# Patient Record
Sex: FEMALE | Race: WHITE
[De-identification: names, ages, dates, MRNs, and addresses within clinical notes are randomized per-mention and may not be internally consistent; named-entity substitution may affect disease eponyms.]

---

## 2018-03-22 ENCOUNTER — HOSPITAL ENCOUNTER (INPATIENT)
Dept: HOSPITAL 88 - ER | Age: 83
LOS: 12 days | Discharge: HOME HEALTH | DRG: 330 | End: 2018-04-03
Attending: INTERNAL MEDICINE | Admitting: INTERNAL MEDICINE
Payer: COMMERCIAL

## 2018-03-22 VITALS — HEIGHT: 60 IN | WEIGHT: 99.06 LBS | BODY MASS INDEX: 19.45 KG/M2

## 2018-03-22 DIAGNOSIS — K44.9: ICD-10-CM

## 2018-03-22 DIAGNOSIS — Z95.5: ICD-10-CM

## 2018-03-22 DIAGNOSIS — D50.0: ICD-10-CM

## 2018-03-22 DIAGNOSIS — R13.10: ICD-10-CM

## 2018-03-22 DIAGNOSIS — C78.7: ICD-10-CM

## 2018-03-22 DIAGNOSIS — C79.70: ICD-10-CM

## 2018-03-22 DIAGNOSIS — I10: ICD-10-CM

## 2018-03-22 DIAGNOSIS — K64.8: ICD-10-CM

## 2018-03-22 DIAGNOSIS — K56.7: ICD-10-CM

## 2018-03-22 DIAGNOSIS — D62: ICD-10-CM

## 2018-03-22 DIAGNOSIS — K59.00: ICD-10-CM

## 2018-03-22 DIAGNOSIS — K22.2: ICD-10-CM

## 2018-03-22 DIAGNOSIS — R00.1: ICD-10-CM

## 2018-03-22 DIAGNOSIS — K29.70: ICD-10-CM

## 2018-03-22 DIAGNOSIS — R19.7: ICD-10-CM

## 2018-03-22 DIAGNOSIS — I25.10: ICD-10-CM

## 2018-03-22 DIAGNOSIS — C18.2: Primary | ICD-10-CM

## 2018-03-22 LAB
ALBUMIN SERPL-MCNC: 3.4 G/DL (ref 3.5–5)
ALBUMIN/GLOB SERPL: 1.1 {RATIO} (ref 0.8–2)
ALP SERPL-CCNC: 64 IU/L (ref 40–150)
ALT SERPL-CCNC: 7 IU/L (ref 0–55)
ANION GAP SERPL CALC-SCNC: 12.1 MMOL/L (ref 8–16)
BACTERIA URNS QL MICRO: (no result) /HPF
BASOPHILS # BLD AUTO: 0 10*3/UL (ref 0–0.1)
BASOPHILS NFR BLD AUTO: 0.5 % (ref 0–1)
BILIRUB UR QL: NEGATIVE
BUN SERPL-MCNC: 18 MG/DL (ref 7–26)
BUN/CREAT SERPL: 27 (ref 6–25)
CALCIUM SERPL-MCNC: 9.1 MG/DL (ref 8.4–10.2)
CHLORIDE SERPL-SCNC: 107 MMOL/L (ref 98–107)
CK MB SERPL-MCNC: 0.7 NG/ML (ref 0–5)
CK SERPL-CCNC: 60 IU/L (ref 29–168)
CLARITY UR: (no result)
CO2 SERPL-SCNC: 26 MMOL/L (ref 22–29)
COLOR UR: YELLOW
DEPRECATED APTT PLAS QN: 27.2 SECONDS (ref 23.8–35.5)
DEPRECATED INR PLAS: 1.11
DEPRECATED NEUTROPHILS # BLD AUTO: 3.6 10*3/UL (ref 2.1–6.9)
DEPRECATED RBC URNS MANUAL-ACNC: (no result) /HPF (ref 0–5)
EGFRCR SERPLBLD CKD-EPI 2021: > 60 ML/MIN (ref 60–?)
EOSINOPHIL # BLD AUTO: 0.1 10*3/UL (ref 0–0.4)
EOSINOPHIL NFR BLD AUTO: 1.4 % (ref 0–6)
EPI CELLS URNS QL MICRO: (no result) /LPF
ERYTHROCYTE [DISTWIDTH] IN CORD BLOOD: 20.6 % (ref 11.7–14.4)
GLOBULIN PLAS-MCNC: 3.1 G/DL (ref 2.3–3.5)
GLUCOSE SERPLBLD-MCNC: 118 MG/DL (ref 74–118)
HCT VFR BLD AUTO: 20.5 % (ref 34.2–44.1)
HGB BLD-MCNC: 5.6 G/DL (ref 12–16)
KETONES UR QL STRIP.AUTO: NEGATIVE
LEUKOCYTE ESTERASE UR QL STRIP.AUTO: (no result)
LYMPHOCYTES # BLD: 1.8 10*3/UL (ref 1–3.2)
LYMPHOCYTES NFR BLD AUTO: 29.5 % (ref 18–39.1)
MCH RBC QN AUTO: 17.2 PG (ref 28–32)
MCHC RBC AUTO-ENTMCNC: 27.3 G/DL (ref 31–35)
MCV RBC AUTO: 62.9 FL (ref 81–99)
MONOCYTES # BLD AUTO: 0.6 10*3/UL (ref 0.2–0.8)
MONOCYTES NFR BLD AUTO: 9.8 % (ref 4.4–11.3)
NEUTS SEG NFR BLD AUTO: 58.5 % (ref 38.7–80)
NITRITE UR QL STRIP.AUTO: NEGATIVE
PLATELET # BLD AUTO: 436 X10E3/UL (ref 140–360)
POTASSIUM SERPL-SCNC: 4.1 MMOL/L (ref 3.5–5.1)
PROT UR QL STRIP.AUTO: NEGATIVE
PROTHROMBIN TIME: 13.5 SECONDS (ref 11.9–14.5)
RBC # BLD AUTO: 3.26 X10E6/UL (ref 3.6–5.1)
SODIUM SERPL-SCNC: 141 MMOL/L (ref 136–145)
SP GR UR STRIP: 1.01 (ref 1.01–1.02)
UROBILINOGEN UR STRIP-MCNC: 1 MG/DL (ref 0.2–1)
WBC #/AREA URNS HPF: (no result) /HPF (ref 0–5)

## 2018-03-22 PROCEDURE — 36415 COLL VENOUS BLD VENIPUNCTURE: CPT

## 2018-03-22 PROCEDURE — 82550 ASSAY OF CK (CPK): CPT

## 2018-03-22 PROCEDURE — 74018 RADEX ABDOMEN 1 VIEW: CPT

## 2018-03-22 PROCEDURE — 93306 TTE W/DOPPLER COMPLETE: CPT

## 2018-03-22 PROCEDURE — 71260 CT THORAX DX C+: CPT

## 2018-03-22 PROCEDURE — 99284 EMERGENCY DEPT VISIT MOD MDM: CPT

## 2018-03-22 PROCEDURE — 83735 ASSAY OF MAGNESIUM: CPT

## 2018-03-22 PROCEDURE — 84443 ASSAY THYROID STIM HORMONE: CPT

## 2018-03-22 PROCEDURE — 85025 COMPLETE CBC W/AUTO DIFF WBC: CPT

## 2018-03-22 PROCEDURE — 74178 CT ABD&PLV WO CNTR FLWD CNTR: CPT

## 2018-03-22 PROCEDURE — 88342 IMHCHEM/IMCYTCHM 1ST ANTB: CPT

## 2018-03-22 PROCEDURE — 80048 BASIC METABOLIC PNL TOTAL CA: CPT

## 2018-03-22 PROCEDURE — 81001 URINALYSIS AUTO W/SCOPE: CPT

## 2018-03-22 PROCEDURE — 80053 COMPREHEN METABOLIC PANEL: CPT

## 2018-03-22 PROCEDURE — 87493 C DIFF AMPLIFIED PROBE: CPT

## 2018-03-22 PROCEDURE — 83540 ASSAY OF IRON: CPT

## 2018-03-22 PROCEDURE — 83921 ORGANIC ACID SINGLE QUANT: CPT

## 2018-03-22 PROCEDURE — 88305 TISSUE EXAM BY PATHOLOGIST: CPT

## 2018-03-22 PROCEDURE — 84466 ASSAY OF TRANSFERRIN: CPT

## 2018-03-22 PROCEDURE — 43239 EGD BIOPSY SINGLE/MULTIPLE: CPT

## 2018-03-22 PROCEDURE — 82378 CARCINOEMBRYONIC ANTIGEN: CPT

## 2018-03-22 PROCEDURE — 82607 VITAMIN B-12: CPT

## 2018-03-22 PROCEDURE — 86850 RBC ANTIBODY SCREEN: CPT

## 2018-03-22 PROCEDURE — 74177 CT ABD & PELVIS W/CONTRAST: CPT

## 2018-03-22 PROCEDURE — 45380 COLONOSCOPY AND BIOPSY: CPT

## 2018-03-22 PROCEDURE — 96361 HYDRATE IV INFUSION ADD-ON: CPT

## 2018-03-22 PROCEDURE — 82270 OCCULT BLOOD FECES: CPT

## 2018-03-22 PROCEDURE — 96367 TX/PROPH/DG ADDL SEQ IV INF: CPT

## 2018-03-22 PROCEDURE — 93005 ELECTROCARDIOGRAM TRACING: CPT

## 2018-03-22 PROCEDURE — 88312 SPECIAL STAINS GROUP 1: CPT

## 2018-03-22 PROCEDURE — 88307 TISSUE EXAM BY PATHOLOGIST: CPT

## 2018-03-22 PROCEDURE — 71046 X-RAY EXAM CHEST 2 VIEWS: CPT

## 2018-03-22 PROCEDURE — 86920 COMPATIBILITY TEST SPIN: CPT

## 2018-03-22 PROCEDURE — 82553 CREATINE MB FRACTION: CPT

## 2018-03-22 PROCEDURE — 86900 BLOOD TYPING SEROLOGIC ABO: CPT

## 2018-03-22 PROCEDURE — 84484 ASSAY OF TROPONIN QUANT: CPT

## 2018-03-22 PROCEDURE — 82746 ASSAY OF FOLIC ACID SERUM: CPT

## 2018-03-22 PROCEDURE — 87045 FECES CULTURE AEROBIC BACT: CPT

## 2018-03-22 PROCEDURE — 85610 PROTHROMBIN TIME: CPT

## 2018-03-22 PROCEDURE — 82948 REAGENT STRIP/BLOOD GLUCOSE: CPT

## 2018-03-22 PROCEDURE — 88309 TISSUE EXAM BY PATHOLOGIST: CPT

## 2018-03-22 PROCEDURE — 85730 THROMBOPLASTIN TIME PARTIAL: CPT

## 2018-03-22 RX ADMIN — SODIUM CHLORIDE SCH MLS/HR: 9 INJECTION, SOLUTION INTRAVENOUS at 22:43

## 2018-03-22 NOTE — DIAGNOSTIC IMAGING REPORT
EXAM: CHEST 2 VIEWS, PA and lateral

INDICATION: Anemia, constipation

COMPARISON: None



FINDINGS:

LINES/TUBES: None



LUNGS: No consolidations or edema. Emphysematous changes.



PLEURA: No effusions or pneumothorax.



HEART AND MEDIASTINUM: Normal size and contour.



BONES AND SOFT TISSUES: No acute findings. Surgical clips right upper quadrant

of the abdomen.



IMPRESSION:

No acute thoracic abnormality.







Signed by: Dr. Ashley Bass M.D. on 3/22/2018 9:57 PM

## 2018-03-22 NOTE — XMS REPORT
Clinical Summary

 Created on: 2018



Jorge Flores

External Reference #: [id number]

: 1935

Sex: Female



Demographics







 Address  [address] [place]  [address]

 

 Home Phone  [phone]

 

 Preferred Language  English

 

 Marital Status  

 

 Quaker Affiliation  Unknown

 

 Race  White

 

 Ethnic Group  Non-





Author







 Author  [facility]

 

 Organization  [facility]

 

 Address  Unknown

 

 Phone  Unavailable







Support







 Name  Relationship  Address  Phone

 

 Ruperto Flores  ECON  Unknown  [phone]







Care Team Providers







 Care Team Member Name  Role  Phone

 

 Asked, Pcp  PCP  Unavailable







Allergies







    



  Active Allergy   Reactions   Severity   Noted Date   Comments

 

    



  Codeine   Rash   Low   2017 







Current Medications







      



  Hospital, Clinic, or   Ordered Dose   Route   Frequency   Start   End Date   
Status



  Other Facility      Date  



  Administered Medication      

 

      



  ciprofloxacin-dexamethaso   4 drop   RIGHT EAR   BID   20   
Ended



  ne (CIPRODEX) otic      17   17 



  suspension 4      



  dropIndications: Chronic      



  diffuse otitis externa of      



  both ears      







Active Problems







 



  Problem   Noted Date

 

 



  Chronic diffuse otitis externa of both ears   2017







Encounters







    



  Date   Type   Specialty   Care Team   Description

 

    



  2017   Office Visit   Otolaryngology   Casimiro Spain MD   Chronic 
diffuse otitis



      externa of both ears



      (Primary Dx)



after 2017



Social History







    



  Tobacco Use   Types   Packs/Day   Years Used   Date

 

    



  Never Smoker    









   



  Alcohol Use   Drinks/Week   oz/Week   Comments

 

   



  No   









 



  Sex Assigned at Birth   Date Recorded

 

 



  Not on file 







Last Filed Vital Signs







  



  Vital Sign   Reading   Time Taken

 

  



  Blood Pressure   -   -

 

  



  Pulse   -   -

 

  



  Temperature   -   -

 

  



  Respiratory Rate   -   -

 

  



  Oxygen Saturation   -   -

 

  



  Inhaled Oxygen   -   -



  Concentration  

 

  



  Weight   43.5 kg (96 lb)   2017 10:06 AM CDT

 

  



  Height   152.4 cm (5')   2017 10:06 AM CDT

 

  



  Body Mass Index   18.75   2017 10:06 AM CDT







Plan of Treatment







   



  Health Maintenance   Due Date   Last Done   Comments

 

   



  ZOSTER VACCINE   1995  

 

   



  PNEUMOCOCCAL   2000  



  POLYSACCHARIDE VACCINE   



  AGE 65 AND OVER   

 

   



  PNEUMOCOCCAL-13   2000  

 

   



  INFLUENZA VACCINE   2017  







Results

Not on fileafter 2017



Insurance







     



  Payer   Benefit   Subscriber ID   Type   Phone   Address



   Plan /    



   Group    

 

     



  TEXANPLUS   TEXANPLUS   xxxxxxxxx   O  



   [facility]    









     



  Guarantor Name   Account   Relation to   Date of   Phone   Billing Address



   Type   Patient   Birth  

 

     



  LAVERNEJORGE MORTON   Personal/F   Self   1935   Home:   [address]     [phone]   [place] [address]

## 2018-03-22 NOTE — XMS REPORT
Clinical Summary

 Created on: 2018



Jorge Flores

External Reference #: [id number]

: 1935

Sex: Female



Demographics







 Address  [address] [place]  [address]

 

 Home Phone  [phone]

 

 Preferred Language  English

 

 Marital Status  

 

 Roman Catholic Affiliation  Unknown

 

 Race  White

 

 Ethnic Group  Non-





Author







 Author  [facility]

 

 Organization  [facility]

 

 Address  Unknown

 

 Phone  Unavailable







Support







 Name  Relationship  Address  Phone

 

 Ruperto Flores  [employer]  Unknown  [phone]







Care Team Providers







 Care Team Member Name  Role  Phone

 

 Asked, Pcp  PCP  Unavailable







Allergies







    



  Active Allergy   Reactions   Severity   Noted Date   Comments

 

    



  Codeine   Rash   Low   2017 







Current Medications







      



  Hospital, Clinic, or   Ordered Dose   Route   Frequency   Start   End Date   
Status



  Other Facility      Date  



  Administered Medication      

 

      



  ciprofloxacin-dexamethaso   4 drop   RIGHT EAR   BID   20   
Ended



  ne (CIPRODEX) otic      17   17 



  suspension 4      



  dropIndications: Chronic      



  diffuse otitis externa of      



  both ears      







Active Problems







 



  Problem   Noted Date

 

 



  Chronic diffuse otitis externa of both ears   2017







Encounters







    



  Date   Type   Specialty   Care Team   Description

 

    



  2017   Office Visit   Otolaryngology   Casimiro Spain MD   Chronic 
diffuse otitis



      externa of both ears



      (Primary Dx)



after 2017



Social History







    



  Tobacco Use   Types   Packs/Day   Years Used   Date

 

    



  Never Smoker    









   



  Alcohol Use   Drinks/Week   oz/Week   Comments

 

   



  No   









 



  Sex Assigned at Birth   Date Recorded

 

 



  Not on file 







Last Filed Vital Signs







  



  Vital Sign   Reading   Time Taken

 

  



  Blood Pressure   -   -

 

  



  Pulse   -   -

 

  



  Temperature   -   -

 

  



  Respiratory Rate   -   -

 

  



  Oxygen Saturation   -   -

 

  



  Inhaled Oxygen   -   -



  Concentration  

 

  



  Weight   43.5 kg (96 lb)   2017 10:06 AM CDT

 

  



  Height   152.4 cm (5')   2017 10:06 AM CDT

 

  



  Body Mass Index   18.75   2017 10:06 AM CDT







Plan of Treatment







   



  Health Maintenance   Due Date   Last Done   Comments

 

   



  ZOSTER VACCINE   1995  

 

   



  PNEUMOCOCCAL   2000  



  POLYSACCHARIDE VACCINE   



  AGE 65 AND OVER   

 

   



  PNEUMOCOCCAL-13   2000  

 

   



  INFLUENZA VACCINE   2017  







Results

Not on fileafter 2017



Insurance







     



  Payer   Benefit   Subscriber ID   Type   Phone   Address



   Plan /    



   Group    

 

     



  TEXANPLUS   TEXANPLUS   xxxxxxxxx   O  



   [facility]    









     



  Guarantor Name   Account   Relation to   Date of   Phone   Billing Address



   Type   Patient   Birth  

 

     



  LAVERNEJORGE MORTON   Personal/F   Self   1935   Home:   [address]     [phone]   [place] [address]

## 2018-03-22 NOTE — XMS REPORT
Patient Summary Document

 Created on: 2018



JORGE BASS

External Reference #: [id number]

: 1935

Sex: Female



Demographics







 Address  [address] [place], [place] [id number]

 

 Home Phone  [phone]

 

 Preferred Language  Unknown

 

 Marital Status  Unknown

 

 Mosque Affiliation  Unknown

 

 Race  Unknown

 

 Additional Race(s)  

 

 Ethnic Group  Unknown





Author







 [facility]  [facility]

 

 Address  Unknown

 

 Phone  Unavailable







Care Team Providers







 Care Team Member Name  Role  Phone

 

 SWEET, LAIRD  Unavailable  Unavailable







Problems

This patient has no known problems.



Allergies, Adverse Reactions, Alerts

This patient has no known allergies or adverse reactions.



Medications

This patient has no known medications.



Results







 Test Description  Test Time  Test Comments  Text Results  Atomic Results  
Result Comments









 CHEST 2 VIEWS            [place] [address] [place] [address]      Patient Name: JORGE BASS   
MR #: [id number]    : 1935 Age/Sex: 82/F  Acct #: [id number] Req #: 
[id number]  Adm Physician:     Ordered by: DAISY CORTEZ MD  Report #: [id number]
[id number]   Location: ER  Room/Bed:     _____________________________________________
______________________________________________________    Procedure: [id number] 
DX/CHEST 2 VIEWS  Exam Date:                             Exam Time:        
REPORT STATUS: Signed    EXAM: CHEST 2 VIEWS, PA and lateral   INDICATION: 
Anemia, constipation   COMPARISON: None      FINDINGS:   LINES/TUBES: None      
LUNGS: No consolidations or edema. Emphysematous changes.      PLEURA: No 
effusions or pneumothorax.      HEART AND MEDIASTINUM: Normal size and contour.
      BONES AND SOFT TISSUES: No acute findings. Surgical clips right upper 
quadrant   of the abdomen.      IMPRESSION:   No acute thoracic abnormality.   
         Signed by: Dr. Bismark Farrell M.D. on 3/22/2018 9:57 PM        
Dictated By: BISMARK FARRELL MD  Electronically Signed By: BISMARK FARRELL MD on 2157  Transcribed By: DOMINIC on 18       COPY TO:   DAISY CORTEZ MD           

 

 ABDOMEN COMP INCL UPR or DECUB            [place] [address]      Patient Name: 
JORGE BASS   MR #: [id number]    : 1935 Age/Sex: 82/F  Acct #: 
[id number] Req #: [id number]  Adm Physician:     Ordered by: DAISY CORTEZ MD  Report #: [id number]   Location: ER  Room/Bed:     __________________________
_________________________________________________________________________    
Procedure: [id number] DX/ABDOMEN COMP INCL UPR or DECUB  Exam Date:             
                Exam Time:        REPORT STATUS: Signed    EXAM:  ABDOMEN COMP 
INCL UPR or DECUB, supine and erect   INDICATION: Anemia, constipation   
COMPARISON: None      FINDINGS:   LINES/TUBES: None      BOWEL PATTERN: No 
evidence for obstruction.      SOFT TISSUES:     Surgical clips right upper 
quadrant of the abdomen. Surgical   clips project over the pelvis. Phleboliths 
in the pelvis.      LUNG BASES: Not included      BONES: No acute findings.    
  IMPRESSION:   Moderate to large amount of retained stool. No evidence of 
obstruction.                  Signed by: Dr. Bismark Farrell M.D. on 3/22/
2018 9:58 PM        Dictated By: BISMARK FARRELL MD  Electronically Signed By: 
BISMARK FARRELL MD on 18  Transcribed By: DOMINIC on 18    
   COPY TO:   DAISY CORTEZ MD

## 2018-03-22 NOTE — DIAGNOSTIC IMAGING REPORT
EXAM:  ABDOMEN COMP INCL UPR or DECUB, supine and erect

INDICATION: Anemia, constipation

COMPARISON: None



FINDINGS:

LINES/TUBES: None



BOWEL PATTERN: No evidence for obstruction.



SOFT TISSUES:     Surgical clips right upper quadrant of the abdomen. Surgical

clips project over the pelvis. Phleboliths in the pelvis.



LUNG BASES: Not included



BONES: No acute findings.



IMPRESSION:

Moderate to large amount of retained stool. No evidence of obstruction.











Signed by: Dr. Ashley Bass M.D. on 3/22/2018 9:58 PM

## 2018-03-23 VITALS — SYSTOLIC BLOOD PRESSURE: 147 MMHG | DIASTOLIC BLOOD PRESSURE: 67 MMHG

## 2018-03-23 VITALS — DIASTOLIC BLOOD PRESSURE: 67 MMHG | SYSTOLIC BLOOD PRESSURE: 148 MMHG

## 2018-03-23 VITALS — DIASTOLIC BLOOD PRESSURE: 63 MMHG | SYSTOLIC BLOOD PRESSURE: 134 MMHG

## 2018-03-23 VITALS — DIASTOLIC BLOOD PRESSURE: 71 MMHG | SYSTOLIC BLOOD PRESSURE: 163 MMHG

## 2018-03-23 VITALS — DIASTOLIC BLOOD PRESSURE: 66 MMHG | SYSTOLIC BLOOD PRESSURE: 143 MMHG

## 2018-03-23 VITALS — SYSTOLIC BLOOD PRESSURE: 143 MMHG | DIASTOLIC BLOOD PRESSURE: 66 MMHG

## 2018-03-23 VITALS — SYSTOLIC BLOOD PRESSURE: 133 MMHG | DIASTOLIC BLOOD PRESSURE: 61 MMHG

## 2018-03-23 LAB
ALBUMIN SERPL-MCNC: 3.9 G/DL (ref 3.5–5)
ALBUMIN/GLOB SERPL: 1.1 {RATIO} (ref 0.8–2)
ALP SERPL-CCNC: 72 IU/L (ref 40–150)
ALT SERPL-CCNC: 10 IU/L (ref 0–55)
ANION GAP SERPL CALC-SCNC: 15.4 MMOL/L (ref 8–16)
BASOPHILS # BLD AUTO: 0.1 10*3/UL (ref 0–0.1)
BASOPHILS NFR BLD AUTO: 0.7 % (ref 0–1)
BUN SERPL-MCNC: 12 MG/DL (ref 7–26)
BUN/CREAT SERPL: 16 (ref 6–25)
CALCIUM SERPL-MCNC: 9.7 MG/DL (ref 8.4–10.2)
CHLORIDE SERPL-SCNC: 95 MMOL/L (ref 98–107)
CO2 SERPL-SCNC: 31 MMOL/L (ref 22–29)
DEPRECATED NEUTROPHILS # BLD AUTO: 5.4 10*3/UL (ref 2.1–6.9)
EGFRCR SERPLBLD CKD-EPI 2021: > 60 ML/MIN (ref 60–?)
EOSINOPHIL # BLD AUTO: 0.1 10*3/UL (ref 0–0.4)
EOSINOPHIL NFR BLD AUTO: 1.2 % (ref 0–6)
ERYTHROCYTE [DISTWIDTH] IN CORD BLOOD: 26.8 % (ref 11.7–14.4)
FOLATE SERPL-MCNC: 16.7 NG/ML (ref 7–15.4)
GLOBULIN PLAS-MCNC: 3.5 G/DL (ref 2.3–3.5)
GLUCOSE SERPLBLD-MCNC: 113 MG/DL (ref 74–118)
HCT VFR BLD AUTO: 41.9 % (ref 34.2–44.1)
HGB BLD-MCNC: 13.7 G/DL (ref 12–16)
IRON SATN MFR SERPL: 4 % (ref 15–50)
IRON SERPL-MCNC: 20 UG/DL (ref 50–170)
LYMPHOCYTES # BLD: 1.3 10*3/UL (ref 1–3.2)
LYMPHOCYTES NFR BLD AUTO: 17.8 % (ref 18–39.1)
MCH RBC QN AUTO: 23.7 PG (ref 28–32)
MCHC RBC AUTO-ENTMCNC: 32.7 G/DL (ref 31–35)
MCV RBC AUTO: 72.5 FL (ref 81–99)
MONOCYTES # BLD AUTO: 0.6 10*3/UL (ref 0.2–0.8)
MONOCYTES NFR BLD AUTO: 7.8 % (ref 4.4–11.3)
NEUTS SEG NFR BLD AUTO: 72.2 % (ref 38.7–80)
PLATELET # BLD AUTO: 390 X10E3/UL (ref 140–360)
POTASSIUM SERPL-SCNC: 3.4 MMOL/L (ref 3.5–5.1)
RBC # BLD AUTO: 5.78 X10E6/UL (ref 3.6–5.1)
SODIUM SERPL-SCNC: 138 MMOL/L (ref 136–145)
TIBC SERPL-MCNC: 508 UG/DL (ref 261–478)
TRANSFERRIN SERPL-MCNC: 363 MG/DL (ref 180–382)
TSH SERPL DL<=0.005 MIU/L-ACNC: 1.13 UIU/ML (ref 0.35–4.94)
VIT B12 BLD-MCNC: 218 PG/ML (ref 213–816)

## 2018-03-23 PROCEDURE — 30250N1: ICD-10-PCS | Performed by: EMERGENCY MEDICINE

## 2018-03-23 RX ADMIN — Medication SCH MG: at 20:24

## 2018-03-23 RX ADMIN — FUROSEMIDE PRN MG: 10 INJECTION, SOLUTION INTRAMUSCULAR; INTRAVENOUS at 13:30

## 2018-03-23 RX ADMIN — Medication PRN MG: at 03:06

## 2018-03-23 RX ADMIN — FUROSEMIDE PRN MG: 10 INJECTION, SOLUTION INTRAMUSCULAR; INTRAVENOUS at 08:46

## 2018-03-23 RX ADMIN — FUROSEMIDE PRN MG: 10 INJECTION, SOLUTION INTRAMUSCULAR; INTRAVENOUS at 04:58

## 2018-03-23 RX ADMIN — SODIUM CHLORIDE SCH MLS/HR: 9 INJECTION, SOLUTION INTRAVENOUS at 18:43

## 2018-03-23 RX ADMIN — SIMVASTATIN SCH MG: 20 TABLET, FILM COATED ORAL at 20:24

## 2018-03-23 RX ADMIN — Medication SCH MG: at 13:00

## 2018-03-23 NOTE — HISTORY AND PHYSICAL
PRIMARY CARE PHYSICIAN:  Dr. Chente Cook.



CHIEF COMPLAINT:  Severe anemia, increasing shortness of breath.



HISTORY OF PRESENT ILLNESS:  Patient is an 82-year-old female with history 

of anemia but no blood transfusion previously.  She had a colonoscopy 

approximately 4 years ago with polyps.  The procedure was done by Dr. Rene Hamilton.  No previous history of EGD.  Patient also had a coronary stent 

placement in 1999, and she is on Plavix.  Otherwise she is stable.  She is 

getting 30 units of blood transfusion at this time.  The patient will be 

pending for further evaluation and treatment.



PAST MEDICAL HISTORY:  Chronic anemia.  Coronary artery disease with 

previous stent placement in 1999.  Hypertension.  Hyperlipidemia.  Reflux 

history.



PAST SURGICAL HISTORY:  Coronary stent.  Cholecystectomy.  Complete 

abdominal hysterectomy.



SOCIAL HISTORY:  Patient does not smoke or use alcohol.  She lives with her 

son.



ALLERGIES:  CODEINE.



MEDICATIONS:  Plavix, Protonix, simvastatin, atenolol.



PHYSICAL EXAMINATION:  

VITAL SIGNS:  Temperature is 98.  Blood pressure 147/67.  Pulse rate 75.  

Respirations 18. 

GENERAL:  The patient is not in acute distress.  She is awake.

HEENT:  Normocephalic.  Sclerae anicteric. 

NECK:  Supple grossly. 

PULMONARY:  Clear.

CARDIOVASCULAR:  Regular rate and rhythm. 

ABDOMEN:  Soft.  No distention.  



EXTREMITIES:  No gross cyanosis or edema. 

NEUROLOGIC:  There is no gross focal deficit. 



LABORATORY:  Sodium is 141, potassium 4.1, chloride 107, bicarb 26, BUN 18, 

creatinine 0.7, glucose 118.  WBC 6.2, hemoglobin 5.6, hematocrit 20.5, 

platelets are 436.



INR is 1.1.  PT is 13.5.  PTT 27.2.



AST is 14, ALT 7, total bilirubin is 0.4, alkaline phosphatase 64.



IMPRESSION:  

1. Acute on chronic anemia, most likely.

2. Constipation. 

3. Blood transfusion.

4. History of coronary disease with stent placement back in 1999.



PLAN:  Blood transfusion is already initiated and is on the 3rd unit.  

Consultation with Dr. Rene Hamilton. 



Iron profile, B12, folic acid, fecal for occult blood in the stool.  

Management of constipation.



If no procedure today, the patient may initiate on diet.  The patient will 

need endoscopy done.









DD:  03/23/2018 12:09

DT:  03/23/2018 12:52

Job#:  [id number]

## 2018-03-24 VITALS — DIASTOLIC BLOOD PRESSURE: 63 MMHG | SYSTOLIC BLOOD PRESSURE: 136 MMHG

## 2018-03-24 VITALS — DIASTOLIC BLOOD PRESSURE: 58 MMHG | SYSTOLIC BLOOD PRESSURE: 126 MMHG

## 2018-03-24 VITALS — SYSTOLIC BLOOD PRESSURE: 140 MMHG | DIASTOLIC BLOOD PRESSURE: 63 MMHG

## 2018-03-24 VITALS — DIASTOLIC BLOOD PRESSURE: 81 MMHG | SYSTOLIC BLOOD PRESSURE: 182 MMHG

## 2018-03-24 VITALS — DIASTOLIC BLOOD PRESSURE: 75 MMHG | SYSTOLIC BLOOD PRESSURE: 157 MMHG

## 2018-03-24 VITALS — DIASTOLIC BLOOD PRESSURE: 56 MMHG | SYSTOLIC BLOOD PRESSURE: 122 MMHG

## 2018-03-24 VITALS — SYSTOLIC BLOOD PRESSURE: 126 MMHG | DIASTOLIC BLOOD PRESSURE: 58 MMHG

## 2018-03-24 LAB
ANION GAP SERPL CALC-SCNC: 12.6 MMOL/L (ref 8–16)
ANISOCYTOSIS BLD QL SMEAR: (no result)
BASOPHILS # BLD AUTO: 0 10*3/UL (ref 0–0.1)
BASOPHILS NFR BLD AUTO: 0.6 % (ref 0–1)
BUN SERPL-MCNC: 15 MG/DL (ref 7–26)
BUN/CREAT SERPL: 22 (ref 6–25)
CALCIUM SERPL-MCNC: 9.4 MG/DL (ref 8.4–10.2)
CHLORIDE SERPL-SCNC: 99 MMOL/L (ref 98–107)
CO2 SERPL-SCNC: 28 MMOL/L (ref 22–29)
DEPRECATED NEUTROPHILS # BLD AUTO: 4.4 10*3/UL (ref 2.1–6.9)
EGFRCR SERPLBLD CKD-EPI 2021: > 60 ML/MIN (ref 60–?)
ELLIPTOCYTES BLD QL SMEAR: SLIGHT
EOSINOPHIL # BLD AUTO: 0.2 10*3/UL (ref 0–0.4)
EOSINOPHIL NFR BLD AUTO: 2.3 % (ref 0–6)
ERYTHROCYTE [DISTWIDTH] IN CORD BLOOD: 27.1 % (ref 11.7–14.4)
GLUCOSE SERPLBLD-MCNC: 104 MG/DL (ref 74–118)
HCT VFR BLD AUTO: 40.6 % (ref 34.2–44.1)
HGB BLD-MCNC: 13.1 G/DL (ref 12–16)
LYMPHOCYTES # BLD: 1.4 10*3/UL (ref 1–3.2)
LYMPHOCYTES NFR BLD AUTO: 20.9 % (ref 18–39.1)
MCH RBC QN AUTO: 23.3 PG (ref 28–32)
MCHC RBC AUTO-ENTMCNC: 32.3 G/DL (ref 31–35)
MCV RBC AUTO: 72.2 FL (ref 81–99)
MONOCYTES # BLD AUTO: 0.8 10*3/UL (ref 0.2–0.8)
MONOCYTES NFR BLD AUTO: 11.3 % (ref 4.4–11.3)
NEUTS SEG NFR BLD AUTO: 64.5 % (ref 38.7–80)
PLAT MORPH BLD: NORMAL
PLATELET # BLD AUTO: 339 X10E3/UL (ref 140–360)
PLATELET # BLD EST: ADEQUATE 10*3/UL
POIKILOCYTOSIS BLD QL SMEAR: (no result)
POTASSIUM SERPL-SCNC: 3.6 MMOL/L (ref 3.5–5.1)
RBC # BLD AUTO: 5.62 X10E6/UL (ref 3.6–5.1)
RBC MORPH BLD: (no result)
SODIUM SERPL-SCNC: 136 MMOL/L (ref 136–145)
STOMATOCYTES BLD QL SMEAR: SLIGHT

## 2018-03-24 RX ADMIN — ATENOLOL SCH MG: 50 TABLET ORAL at 08:18

## 2018-03-24 RX ADMIN — SIMVASTATIN SCH MG: 20 TABLET, FILM COATED ORAL at 20:28

## 2018-03-24 RX ADMIN — Medication SCH MG: at 20:27

## 2018-03-24 RX ADMIN — PANTOPRAZOLE SODIUM SCH MG: 40 TABLET, DELAYED RELEASE ORAL at 08:18

## 2018-03-24 RX ADMIN — SODIUM CHLORIDE SCH MLS/HR: 9 INJECTION, SOLUTION INTRAVENOUS at 14:43

## 2018-03-24 NOTE — PROGRESS NOTE
DATE:  March 24, 2018 



SUBJECTIVE:  Patient is reporting no abdominal pain.  She is tolerating 

clear liquids.



REVIEW OF SYSTEMS:

GENERAL:  No fever or chills.

CVS:  No chest pain or palpitation.

RESPIRATORY:  No cough or expectoration.



MEDICATIONS:  Reviewed MAR.



PHYSICAL EXAMINATION:

VITAL SIGNS:  Temperature 96.4, pulse 58, respiration 18, blood pressure 

136/63 to 157/75, oxygen saturation 100% on room air.

GENERAL:  Elderly frail lady, thin body habitus, low muscle mass.

HEENT:  Mucous membranes moist.  Anicteric sclerae.

CVS:  S1 and S2 regular.

LUNGS:  Bilaterally grossly clear.

ABDOMEN:  Thin, nondistended, nontender.  No palpable mass or hernia.  

Positive bowel sounds.

EXTREMITIES:  Warm.  No leg edema.



LABS:  Hemoglobin 13.1 from 13.7.  WBC 6.88, hematocrit 40.6, platelet 

count 339,000.  Electrolytes normal.  Abdominal x-ray on March 22 





IMPRESSION:  Symptomatic microcytic anemia.  Stool is heme occult positive.



PLAN: Bowel prep tonight.  Continue clear liquid diet. 
Esophagogastroduodenoscopy and colonoscopy tomorrow.



DD:  03/24/2018 18:12 DT:  03/24/2018 23:14

Job#:  [id number] DR FARMER

## 2018-03-25 VITALS — DIASTOLIC BLOOD PRESSURE: 64 MMHG | SYSTOLIC BLOOD PRESSURE: 138 MMHG

## 2018-03-25 VITALS — SYSTOLIC BLOOD PRESSURE: 148 MMHG | DIASTOLIC BLOOD PRESSURE: 65 MMHG

## 2018-03-25 VITALS — SYSTOLIC BLOOD PRESSURE: 154 MMHG | DIASTOLIC BLOOD PRESSURE: 59 MMHG

## 2018-03-25 VITALS — SYSTOLIC BLOOD PRESSURE: 135 MMHG | DIASTOLIC BLOOD PRESSURE: 63 MMHG

## 2018-03-25 VITALS — DIASTOLIC BLOOD PRESSURE: 70 MMHG | SYSTOLIC BLOOD PRESSURE: 154 MMHG

## 2018-03-25 VITALS — SYSTOLIC BLOOD PRESSURE: 158 MMHG | DIASTOLIC BLOOD PRESSURE: 72 MMHG

## 2018-03-25 VITALS — DIASTOLIC BLOOD PRESSURE: 65 MMHG | SYSTOLIC BLOOD PRESSURE: 141 MMHG

## 2018-03-25 PROCEDURE — 0DB68ZX EXCISION OF STOMACH, VIA NATURAL OR ARTIFICIAL OPENING ENDOSCOPIC, DIAGNOSTIC: ICD-10-PCS | Performed by: INTERNAL MEDICINE

## 2018-03-25 PROCEDURE — 0DBL8ZX EXCISION OF TRANSVERSE COLON, VIA NATURAL OR ARTIFICIAL OPENING ENDOSCOPIC, DIAGNOSTIC: ICD-10-PCS | Performed by: INTERNAL MEDICINE

## 2018-03-25 RX ADMIN — ATENOLOL SCH MG: 50 TABLET ORAL at 09:00

## 2018-03-25 RX ADMIN — ATENOLOL SCH MG: 50 TABLET ORAL at 15:46

## 2018-03-25 RX ADMIN — PANTOPRAZOLE SODIUM SCH MG: 40 TABLET, DELAYED RELEASE ORAL at 09:00

## 2018-03-25 RX ADMIN — SIMVASTATIN SCH MG: 20 TABLET, FILM COATED ORAL at 20:53

## 2018-03-25 RX ADMIN — PANTOPRAZOLE SODIUM SCH MG: 40 TABLET, DELAYED RELEASE ORAL at 15:46

## 2018-03-25 RX ADMIN — Medication SCH MG: at 20:53

## 2018-03-25 RX ADMIN — SODIUM CHLORIDE SCH MLS/HR: 9 INJECTION, SOLUTION INTRAVENOUS at 10:43

## 2018-03-25 NOTE — DIAGNOSTIC IMAGING REPORT
******** ADDENDUM #1 ********



Addendum: Questionable thickening of the sigmoid junction. Proctitis not

excluded. Direct visualization could be obtained as indicated.



Signed by: Dr. Janes Saab MD on 3/25/2018 2:23 PM

******** ORIGINAL REPORT ********



EXAM: CT Abdomen and Pelvis WITH contrast  



INDICATION: EGD same day. Abnormal findings. Rule out metastasis.  



COMPARISON: None.



TECHNIQUE:  Abdomen and Pelvis was scanned utilizing a multidetector helical

scanner after administration of IV contrast. Coronal and sagittal reformations

were obtained.    



     IV CONTRAST: 100 mL Isovue-370

             

     COMPLICATIONS: None



RADIATION DOSE:

     Total DLP:155 mGy*cm

     Estimated effective dose: (DLP x 0.015 x size factor) mSv

     CTDIvol has been reviewed. It is below the limits set by the Radiation

Protocol Committee (RPC).



FINDINGS: 

 

Abdomen: 





Lung Bases: Atelectasis present lung bases.



Solid Organs: 9 mm cyst anterior left hepatic lobe. Focal fat attenuation along

the falciform ligament. Cholecystectomy clips are present. There is a 15 mm

nodule in the left adrenal gland with indeterminate Hounsfield units of 71. 20

mm right adrenal lesion present with indeterminate Hounsfield units of 81.

Hypodensity right kidney statistically cyst, but too small to definitively

characterize. Pancreas unremarkable.



Upper GI Tract: Gastric decompression limits evaluation. No small bowel

obstructive changes.



Vascularity: Moderate aortoiliac vascular calcifications with no aneurysm.



Lymph Nodes: No suspicious mesenteric or aortocaval adenopathy.



Other: None.





Pelvis: 





Bladder: Unremarkable.



Other: Uterus absent.



Colon: Areas of decompression limits evaluation. Questionable wall thickening

in the rectal junction.



Bones: No acute findings.





IMPRESSION: 

1.  Bilateral indeterminate adrenal lesions. Adrenal mass protocol CT or MRI

recommended.



2.  If concern is present for metastatic disease in the chest, dedicated CT

chest to be recommended.



3.  Colonic evaluation limited by decompression.



Signed by: Dr. Janes Saab MD on 3/25/2018 1:58 PM

## 2018-03-26 VITALS — DIASTOLIC BLOOD PRESSURE: 60 MMHG | SYSTOLIC BLOOD PRESSURE: 138 MMHG

## 2018-03-26 VITALS — DIASTOLIC BLOOD PRESSURE: 67 MMHG | SYSTOLIC BLOOD PRESSURE: 152 MMHG

## 2018-03-26 VITALS — SYSTOLIC BLOOD PRESSURE: 133 MMHG | DIASTOLIC BLOOD PRESSURE: 68 MMHG

## 2018-03-26 VITALS — SYSTOLIC BLOOD PRESSURE: 143 MMHG | DIASTOLIC BLOOD PRESSURE: 65 MMHG

## 2018-03-26 VITALS — SYSTOLIC BLOOD PRESSURE: 163 MMHG | DIASTOLIC BLOOD PRESSURE: 83 MMHG

## 2018-03-26 VITALS — DIASTOLIC BLOOD PRESSURE: 72 MMHG | SYSTOLIC BLOOD PRESSURE: 161 MMHG

## 2018-03-26 VITALS — SYSTOLIC BLOOD PRESSURE: 161 MMHG | DIASTOLIC BLOOD PRESSURE: 75 MMHG

## 2018-03-26 VITALS — SYSTOLIC BLOOD PRESSURE: 179 MMHG | DIASTOLIC BLOOD PRESSURE: 87 MMHG

## 2018-03-26 VITALS — SYSTOLIC BLOOD PRESSURE: 151 MMHG | DIASTOLIC BLOOD PRESSURE: 71 MMHG

## 2018-03-26 VITALS — SYSTOLIC BLOOD PRESSURE: 139 MMHG | DIASTOLIC BLOOD PRESSURE: 65 MMHG

## 2018-03-26 VITALS — SYSTOLIC BLOOD PRESSURE: 158 MMHG | DIASTOLIC BLOOD PRESSURE: 74 MMHG

## 2018-03-26 VITALS — DIASTOLIC BLOOD PRESSURE: 74 MMHG | SYSTOLIC BLOOD PRESSURE: 157 MMHG

## 2018-03-26 VITALS — DIASTOLIC BLOOD PRESSURE: 65 MMHG | SYSTOLIC BLOOD PRESSURE: 134 MMHG

## 2018-03-26 VITALS — DIASTOLIC BLOOD PRESSURE: 84 MMHG | SYSTOLIC BLOOD PRESSURE: 131 MMHG

## 2018-03-26 VITALS — SYSTOLIC BLOOD PRESSURE: 155 MMHG | DIASTOLIC BLOOD PRESSURE: 72 MMHG

## 2018-03-26 VITALS — DIASTOLIC BLOOD PRESSURE: 87 MMHG | SYSTOLIC BLOOD PRESSURE: 150 MMHG

## 2018-03-26 VITALS — DIASTOLIC BLOOD PRESSURE: 78 MMHG | SYSTOLIC BLOOD PRESSURE: 151 MMHG

## 2018-03-26 VITALS — SYSTOLIC BLOOD PRESSURE: 140 MMHG | DIASTOLIC BLOOD PRESSURE: 61 MMHG

## 2018-03-26 VITALS — DIASTOLIC BLOOD PRESSURE: 77 MMHG | SYSTOLIC BLOOD PRESSURE: 150 MMHG

## 2018-03-26 VITALS — SYSTOLIC BLOOD PRESSURE: 152 MMHG | DIASTOLIC BLOOD PRESSURE: 75 MMHG

## 2018-03-26 VITALS — DIASTOLIC BLOOD PRESSURE: 57 MMHG | SYSTOLIC BLOOD PRESSURE: 129 MMHG

## 2018-03-26 VITALS — SYSTOLIC BLOOD PRESSURE: 143 MMHG | DIASTOLIC BLOOD PRESSURE: 75 MMHG

## 2018-03-26 VITALS — SYSTOLIC BLOOD PRESSURE: 141 MMHG | DIASTOLIC BLOOD PRESSURE: 64 MMHG

## 2018-03-26 VITALS — DIASTOLIC BLOOD PRESSURE: 74 MMHG | SYSTOLIC BLOOD PRESSURE: 158 MMHG

## 2018-03-26 VITALS — DIASTOLIC BLOOD PRESSURE: 56 MMHG | SYSTOLIC BLOOD PRESSURE: 117 MMHG

## 2018-03-26 VITALS — DIASTOLIC BLOOD PRESSURE: 55 MMHG | SYSTOLIC BLOOD PRESSURE: 127 MMHG

## 2018-03-26 VITALS — DIASTOLIC BLOOD PRESSURE: 69 MMHG | SYSTOLIC BLOOD PRESSURE: 143 MMHG

## 2018-03-26 VITALS — SYSTOLIC BLOOD PRESSURE: 128 MMHG | DIASTOLIC BLOOD PRESSURE: 57 MMHG

## 2018-03-26 LAB
ALBUMIN SERPL-MCNC: 2.9 G/DL (ref 3.5–5)
ALBUMIN/GLOB SERPL: 1.1 {RATIO} (ref 0.8–2)
ALP SERPL-CCNC: 62 IU/L (ref 40–150)
ALT SERPL-CCNC: 8 IU/L (ref 0–55)
ANION GAP SERPL CALC-SCNC: 11.8 MMOL/L (ref 8–16)
ANISOCYTOSIS BLD QL SMEAR: (no result)
BASOPHILS # BLD AUTO: 0.1 10*3/UL (ref 0–0.1)
BASOPHILS NFR BLD AUTO: 0.8 % (ref 0–1)
BUN SERPL-MCNC: 8 MG/DL (ref 7–26)
BUN/CREAT SERPL: 13 (ref 6–25)
CALCIUM SERPL-MCNC: 8.7 MG/DL (ref 8.4–10.2)
CHLORIDE SERPL-SCNC: 108 MMOL/L (ref 98–107)
CO2 SERPL-SCNC: 25 MMOL/L (ref 22–29)
DEPRECATED APTT PLAS QN: 28.3 SECONDS (ref 23.8–35.5)
DEPRECATED INR PLAS: 1.14
DEPRECATED NEUTROPHILS # BLD AUTO: 5.5 10*3/UL (ref 2.1–6.9)
EGFRCR SERPLBLD CKD-EPI 2021: > 60 ML/MIN (ref 60–?)
EOSINOPHIL # BLD AUTO: 0.1 10*3/UL (ref 0–0.4)
EOSINOPHIL NFR BLD AUTO: 1.3 % (ref 0–6)
ERYTHROCYTE [DISTWIDTH] IN CORD BLOOD: 27.9 % (ref 11.7–14.4)
GLOBULIN PLAS-MCNC: 2.7 G/DL (ref 2.3–3.5)
GLUCOSE SERPLBLD-MCNC: 87 MG/DL (ref 74–118)
HCT VFR BLD AUTO: 38.3 % (ref 34.2–44.1)
HGB BLD-MCNC: 12.1 G/DL (ref 12–16)
HYPOCHROMIA BLD QL SMEAR: SLIGHT
LYMPHOCYTES # BLD: 1.4 10*3/UL (ref 1–3.2)
LYMPHOCYTES NFR BLD AUTO: 18.6 % (ref 18–39.1)
MCH RBC QN AUTO: 23.4 PG (ref 28–32)
MCHC RBC AUTO-ENTMCNC: 31.6 G/DL (ref 31–35)
MCV RBC AUTO: 74.2 FL (ref 81–99)
MONOCYTES # BLD AUTO: 0.6 10*3/UL (ref 0.2–0.8)
MONOCYTES NFR BLD AUTO: 7.4 % (ref 4.4–11.3)
NEUTS SEG NFR BLD AUTO: 70.6 % (ref 38.7–80)
PLAT MORPH BLD: (no result)
PLATELET # BLD AUTO: 295 X10E3/UL (ref 140–360)
PLATELET # BLD EST: ADEQUATE 10*3/UL
POIKILOCYTOSIS BLD QL SMEAR: SLIGHT
POTASSIUM SERPL-SCNC: 3.8 MMOL/L (ref 3.5–5.1)
PROTHROMBIN TIME: 13.7 SECONDS (ref 11.9–14.5)
RBC # BLD AUTO: 5.16 X10E6/UL (ref 3.6–5.1)
RBC MORPH BLD: (no result)
SODIUM SERPL-SCNC: 141 MMOL/L (ref 136–145)

## 2018-03-26 PROCEDURE — 07BB0ZX EXCISION OF MESENTERIC LYMPHATIC, OPEN APPROACH, DIAGNOSTIC: ICD-10-PCS | Performed by: SURGERY

## 2018-03-26 PROCEDURE — 0DTF0ZZ RESECTION OF RIGHT LARGE INTESTINE, OPEN APPROACH: ICD-10-PCS | Performed by: SURGERY

## 2018-03-26 RX ADMIN — PANTOPRAZOLE SODIUM SCH MG: 40 TABLET, DELAYED RELEASE ORAL at 08:44

## 2018-03-26 RX ADMIN — Medication SCH MG: at 21:18

## 2018-03-26 RX ADMIN — Medication PRN MG: at 17:25

## 2018-03-26 RX ADMIN — SODIUM CHLORIDE SCH MLS/HR: 9 INJECTION, SOLUTION INTRAVENOUS at 19:52

## 2018-03-26 RX ADMIN — ATENOLOL SCH MG: 50 TABLET ORAL at 08:40

## 2018-03-26 RX ADMIN — SODIUM CHLORIDE SCH MLS/HR: 9 INJECTION, SOLUTION INTRAVENOUS at 18:00

## 2018-03-26 NOTE — OPERATIVE REPORT
DATE OF PROCEDURE:  March 26, 2018 



PREOPERATIVE DIAGNOSIS:  Right colonic tumor.



POSTOPERATIVE DIAGNOSIS:  Right transverse colonic tumor.  



OPERATIVE PROCEDURE:  Right colectomy.  



ANESTHESIA:  General.  



INDICATIONS:  The patient is an 82-year-old female with history of anemia.  

Colonoscopy revealed a tumor in the proximal transverse colon.  CT scan has 

shown no evidence of distal metastases.  Patient has consented for right 

colectomy under anesthesia with all attendant risks discussed.



DESCRIPTION OF PROCEDURE:  Patient was brought to the OR intubated.  

Abdomen was prepped with alcohol and draped in a sterile fashion.  Upper 

midline incision was made extending below the umbilicus.  The linea alba 

was entered.  Adhesions from prior surgery were noted, and these were taken 

down with the Bovie.  The right proximal transverse colon tumor was 

identified by the tattoo as well as palpation.  We proceeded to mobilize 

the right colon by dividing the white line of Toldt and opened the 

gastrocolic ligament partially.  The terminal ileum was also mobilized from 

the retroperitoneal attachment.  We then proceeded to use a PRUDENCE stapler and 

divided the transverse colon just proximal to its mid point.  Terminal 

ileum was transected just adjacent to the ileocecal valve.  The 

corresponding mesentery to the right colon was then taken near the roots of 

the mesentery, suture ligating the vascular mesentery with 2-0 silk 

ligature.  The ileocolic vessel was similarly treated.  Some enlarged nodes 

were noted in the mesentery, which  was included in the specimen.  The 

operative field was irrigated.  We then proceeded to stapled anastomosis 

using an Endo-PRUDENCE stapler white load and TL60 stapler.  The mesenteric 

defect was also closed with running 3-0 silk stitch.  Operative field was 

irrigated and hemostasis achieved.  Abdomen was closed by approximating the 

linea alba with running #0 PDS and skin with staples. Patient was extubated 

and transported to the recovery room in guarded condition.  



Estimated blood loss 50 mL.  







DD:  03/26/2018 13:06

DT:  03/26/2018 13:52

Job#:  [id number]

## 2018-03-26 NOTE — CONSULTATION
DATE OF CONSULTATION:  March 26, 2018 



CHIEF COMPLAINT:  The patient is an 82-year-old with surgery for 

transection of colon tumor. 

HISTORY OF PRESENT ILLNESS:  The patient is an 82-year-old who was noted to 

have a tumor obstructing the proximal portion of the transverse colon.  The 

patient subsequently underwent surgery today for resection of the tumor.  

In the post anesthesia care unit, the patient was noted to have sinus 

bradycardia.  The patient is already extubated with no chest pain and 

minimal shortness of breath. 



PAST MEDICAL HISTORY:  History is significant for:

1. Coronary artery disease. 

2. Previous stent placement in the coronary artery. 

3. Hypertension. 

4. Gastroesophageal reflux.  



 HOME MEDICATIONS:  Atenolol, Plavix, simvastatin, Protonix. 



SOCIAL HISTORY:  The patient does not drink and does not smoke. 



FAMILY HISTORY:  No known family history of coronary artery disease.  



PHYSICAL EXAMINATION 

GENERAL:  The patient is an elderly female in no obvious distress. 

VITAL SIGNS:  Temperature 97.6, pulse 65, blood pressure 117/56. 

HEENT:  The patient's cranium was normocephalic, atraumatic.  Extraocular 

muscles were intact.  Sclerae anicteric. Pupils equal, round and reactive 

to light.  There is no pallor or cyanosis of the oral mucosa.  There is no 

erythema or edema of the throat. 

NECK:  Supple.  There is no jugular venous distention.  There are no 

carotid bruits.  

CHEST:  Clear to auscultation and percussion. 

CARDIAC:  Demonstrates a normal S1 and S2 with a short 2/6 systolic murmur. 



ABDOMEN:  Good bowel sounds.  No tenderness, no masses. The patient did 

have recent surgery.

EXTREMITIES:   There is no clubbing and no cyanosis and no edema. 

NEUROLOGIC:  The patient is moving all of her extremities.  



The patient's EKG demonstrated sinus bradycardia with no acute changes. 



IMPRESSION:  The patient is an 82-year-old with recent transection of a 

tumor in the transverse colon with noted some sinus bradycardia.  The 

patient has had no evidence of active cardiac ischemia at this time with no 

chest pain and no shortness of breath.  The patient will need to be 

monitored on telemetry.  



 





DD:  03/26/2018 15:28

DT:  03/26/2018 15:41

Job#:  [id number] 



cc:BERNY KENNEY MD

## 2018-03-26 NOTE — CONSULTATION
DATE OF CONSULTATION:  March 25, 2018 



CHIEF COMPLAINT:  Colonic tumor.



HISTORY OF PRESENT ILLNESS:  The patient is 82-year-old female admitted for 

history of significant anemia and chronic constipation.  The patient was 

found on colonoscopy to have a tumor near obstructing in the proximal 

transverse colon.  She denies history of melena or vomiting.



PAST MEDICAL HISTORY:  Significant for coronary artery disease, status post 

stent placement many years ago.  She also has hypertension, GERD, 

hyperlipidemia, anxiety, and glaucoma.



SURGICAL HISTORY:  Positive for hysterectomy, cholecystectomy, and coronary 

artery stenting.



ALLERGIES:  PATIENT IS ALLERGIC TO CODEINE.



SOCIAL HABITS:  No history of smoking or alcohol use.



REVIEW OF SYSTEMS:  No current chest pain or shortness of breath or cough.



PHYSICAL EXAMINATION:

VITAL SIGNS:  Stable.  She is afebrile.

GENERAL:  Patient is awake, alert, in no apparent distress.

HEENT:  Sclerae nonicteric.

NECK:  Supple.

LUNGS:  Clear.

HEART:  Regular rate and rhythm.  No murmurs.

ABDOMEN:  Soft.  There is no focal tenderness or mass.

EXTREMITIES:  Without cyanosis or edema.



LABORATORY DATA:  White cell count is 6.8, hemoglobin of 13.  Creatinine of 

0.6.  Abdominal x-ray showed constipation.



ASSESSMENT:  Large colonic tumor with severe anemia.  Patient has been 

adequately resuscitated.



PLAN:  Laparoscopic-assisted right colectomy under anesthesia.  Attendant 

risks discussed with patient and family.



Thank you.







DD:  03/25/2018 13:24

DT:  03/25/2018 23:56

Job#:  [id number]

## 2018-03-27 VITALS — SYSTOLIC BLOOD PRESSURE: 104 MMHG | DIASTOLIC BLOOD PRESSURE: 48 MMHG

## 2018-03-27 VITALS — DIASTOLIC BLOOD PRESSURE: 61 MMHG | SYSTOLIC BLOOD PRESSURE: 103 MMHG

## 2018-03-27 VITALS — SYSTOLIC BLOOD PRESSURE: 106 MMHG | DIASTOLIC BLOOD PRESSURE: 47 MMHG

## 2018-03-27 VITALS — DIASTOLIC BLOOD PRESSURE: 46 MMHG | SYSTOLIC BLOOD PRESSURE: 111 MMHG

## 2018-03-27 VITALS — SYSTOLIC BLOOD PRESSURE: 104 MMHG | DIASTOLIC BLOOD PRESSURE: 47 MMHG

## 2018-03-27 VITALS — SYSTOLIC BLOOD PRESSURE: 113 MMHG | DIASTOLIC BLOOD PRESSURE: 53 MMHG

## 2018-03-27 VITALS — DIASTOLIC BLOOD PRESSURE: 66 MMHG | SYSTOLIC BLOOD PRESSURE: 135 MMHG

## 2018-03-27 VITALS — DIASTOLIC BLOOD PRESSURE: 46 MMHG | SYSTOLIC BLOOD PRESSURE: 109 MMHG

## 2018-03-27 VITALS — SYSTOLIC BLOOD PRESSURE: 109 MMHG | DIASTOLIC BLOOD PRESSURE: 47 MMHG

## 2018-03-27 VITALS — DIASTOLIC BLOOD PRESSURE: 46 MMHG | SYSTOLIC BLOOD PRESSURE: 103 MMHG

## 2018-03-27 VITALS — DIASTOLIC BLOOD PRESSURE: 53 MMHG | SYSTOLIC BLOOD PRESSURE: 111 MMHG

## 2018-03-27 VITALS — DIASTOLIC BLOOD PRESSURE: 45 MMHG | SYSTOLIC BLOOD PRESSURE: 107 MMHG

## 2018-03-27 VITALS — SYSTOLIC BLOOD PRESSURE: 109 MMHG | DIASTOLIC BLOOD PRESSURE: 46 MMHG

## 2018-03-27 VITALS — DIASTOLIC BLOOD PRESSURE: 47 MMHG | SYSTOLIC BLOOD PRESSURE: 107 MMHG

## 2018-03-27 VITALS — SYSTOLIC BLOOD PRESSURE: 108 MMHG | DIASTOLIC BLOOD PRESSURE: 46 MMHG

## 2018-03-27 VITALS — DIASTOLIC BLOOD PRESSURE: 47 MMHG | SYSTOLIC BLOOD PRESSURE: 110 MMHG

## 2018-03-27 VITALS — DIASTOLIC BLOOD PRESSURE: 45 MMHG | SYSTOLIC BLOOD PRESSURE: 106 MMHG

## 2018-03-27 VITALS — SYSTOLIC BLOOD PRESSURE: 112 MMHG | DIASTOLIC BLOOD PRESSURE: 47 MMHG

## 2018-03-27 VITALS — SYSTOLIC BLOOD PRESSURE: 116 MMHG | DIASTOLIC BLOOD PRESSURE: 50 MMHG

## 2018-03-27 VITALS — SYSTOLIC BLOOD PRESSURE: 103 MMHG | DIASTOLIC BLOOD PRESSURE: 48 MMHG

## 2018-03-27 VITALS — SYSTOLIC BLOOD PRESSURE: 108 MMHG | DIASTOLIC BLOOD PRESSURE: 47 MMHG

## 2018-03-27 VITALS — SYSTOLIC BLOOD PRESSURE: 106 MMHG | DIASTOLIC BLOOD PRESSURE: 48 MMHG

## 2018-03-27 VITALS — DIASTOLIC BLOOD PRESSURE: 49 MMHG | SYSTOLIC BLOOD PRESSURE: 107 MMHG

## 2018-03-27 VITALS — DIASTOLIC BLOOD PRESSURE: 46 MMHG | SYSTOLIC BLOOD PRESSURE: 116 MMHG

## 2018-03-27 VITALS — DIASTOLIC BLOOD PRESSURE: 48 MMHG | SYSTOLIC BLOOD PRESSURE: 112 MMHG

## 2018-03-27 VITALS — SYSTOLIC BLOOD PRESSURE: 126 MMHG | DIASTOLIC BLOOD PRESSURE: 55 MMHG

## 2018-03-27 VITALS — SYSTOLIC BLOOD PRESSURE: 105 MMHG | DIASTOLIC BLOOD PRESSURE: 47 MMHG

## 2018-03-27 VITALS — SYSTOLIC BLOOD PRESSURE: 126 MMHG | DIASTOLIC BLOOD PRESSURE: 54 MMHG

## 2018-03-27 VITALS — DIASTOLIC BLOOD PRESSURE: 47 MMHG | SYSTOLIC BLOOD PRESSURE: 106 MMHG

## 2018-03-27 VITALS — DIASTOLIC BLOOD PRESSURE: 48 MMHG | SYSTOLIC BLOOD PRESSURE: 109 MMHG

## 2018-03-27 VITALS — SYSTOLIC BLOOD PRESSURE: 107 MMHG | DIASTOLIC BLOOD PRESSURE: 42 MMHG

## 2018-03-27 VITALS — DIASTOLIC BLOOD PRESSURE: 48 MMHG | SYSTOLIC BLOOD PRESSURE: 107 MMHG

## 2018-03-27 VITALS — SYSTOLIC BLOOD PRESSURE: 125 MMHG | DIASTOLIC BLOOD PRESSURE: 55 MMHG

## 2018-03-27 VITALS — SYSTOLIC BLOOD PRESSURE: 91 MMHG | DIASTOLIC BLOOD PRESSURE: 43 MMHG

## 2018-03-27 VITALS — SYSTOLIC BLOOD PRESSURE: 121 MMHG | DIASTOLIC BLOOD PRESSURE: 55 MMHG

## 2018-03-27 VITALS — DIASTOLIC BLOOD PRESSURE: 48 MMHG | SYSTOLIC BLOOD PRESSURE: 108 MMHG

## 2018-03-27 VITALS — SYSTOLIC BLOOD PRESSURE: 111 MMHG | DIASTOLIC BLOOD PRESSURE: 52 MMHG

## 2018-03-27 VITALS — DIASTOLIC BLOOD PRESSURE: 46 MMHG | SYSTOLIC BLOOD PRESSURE: 105 MMHG

## 2018-03-27 VITALS — SYSTOLIC BLOOD PRESSURE: 116 MMHG | DIASTOLIC BLOOD PRESSURE: 49 MMHG

## 2018-03-27 VITALS — DIASTOLIC BLOOD PRESSURE: 48 MMHG | SYSTOLIC BLOOD PRESSURE: 105 MMHG

## 2018-03-27 LAB
ANION GAP SERPL CALC-SCNC: 18.7 MMOL/L (ref 8–16)
ANISOCYTOSIS BLD QL SMEAR: SLIGHT
BASOPHILS # BLD AUTO: 0 10*3/UL (ref 0–0.1)
BASOPHILS NFR BLD AUTO: 0.2 % (ref 0–1)
BUN SERPL-MCNC: 11 MG/DL (ref 7–26)
BUN/CREAT SERPL: 18 (ref 6–25)
CALCIUM SERPL-MCNC: 8.3 MG/DL (ref 8.4–10.2)
CHLORIDE SERPL-SCNC: 111 MMOL/L (ref 98–107)
CO2 SERPL-SCNC: 15 MMOL/L (ref 22–29)
DEPRECATED NEUTROPHILS # BLD AUTO: 14.4 10*3/UL (ref 2.1–6.9)
EGFRCR SERPLBLD CKD-EPI 2021: > 60 ML/MIN (ref 60–?)
EOSINOPHIL # BLD AUTO: 0 10*3/UL (ref 0–0.4)
EOSINOPHIL NFR BLD AUTO: 0.1 % (ref 0–6)
ERYTHROCYTE [DISTWIDTH] IN CORD BLOOD: 28.2 % (ref 11.7–14.4)
GLUCOSE SERPLBLD-MCNC: 54 MG/DL (ref 74–118)
HCT VFR BLD AUTO: 38.6 % (ref 34.2–44.1)
HGB BLD-MCNC: 11.7 G/DL (ref 12–16)
HYPOCHROMIA BLD QL SMEAR: SLIGHT
LYMPHOCYTES # BLD: 1 10*3/UL (ref 1–3.2)
LYMPHOCYTES NFR BLD AUTO: 6 % (ref 18–39.1)
MCH RBC QN AUTO: 23.5 PG (ref 28–32)
MCHC RBC AUTO-ENTMCNC: 30.3 G/DL (ref 31–35)
MCV RBC AUTO: 77.5 FL (ref 81–99)
MONOCYTES # BLD AUTO: 0.8 10*3/UL (ref 0.2–0.8)
MONOCYTES NFR BLD AUTO: 4.9 % (ref 4.4–11.3)
NEUTS SEG NFR BLD AUTO: 88.6 % (ref 38.7–80)
PLAT MORPH BLD: NORMAL
PLATELET # BLD AUTO: 290 X10E3/UL (ref 140–360)
PLATELET # BLD EST: ADEQUATE 10*3/UL
POTASSIUM SERPL-SCNC: 3.7 MMOL/L (ref 3.5–5.1)
RBC # BLD AUTO: 4.98 X10E6/UL (ref 3.6–5.1)
RBC MORPH BLD: (no result)
SODIUM SERPL-SCNC: 141 MMOL/L (ref 136–145)

## 2018-03-27 RX ADMIN — Medication SCH MG: at 20:30

## 2018-03-27 RX ADMIN — Medication PRN MG: at 21:19

## 2018-03-27 RX ADMIN — SODIUM CHLORIDE SCH MLS/HR: 9 INJECTION, SOLUTION INTRAVENOUS at 03:59

## 2018-03-27 RX ADMIN — PANTOPRAZOLE SODIUM SCH MG: 40 TABLET, DELAYED RELEASE ORAL at 11:26

## 2018-03-27 RX ADMIN — SODIUM CHLORIDE SCH MLS/HR: 9 INJECTION, SOLUTION INTRAVENOUS at 14:55

## 2018-03-27 RX ADMIN — ATENOLOL SCH MG: 50 TABLET ORAL at 08:30

## 2018-03-27 RX ADMIN — Medication PRN MG: at 14:10

## 2018-03-27 RX ADMIN — Medication PRN MG: at 01:03

## 2018-03-27 RX ADMIN — Medication PRN MG: at 07:27

## 2018-03-27 NOTE — PROGRESS NOTE
DATE:  March 27, 2018 



SUBJECTIVE:  The patient reports mild incisional pain.  She underwent 

laparoscopic cholecystectomy on Monday.  She has been allowed clear liquids 

which she is tolerating very well.  She is passing flatus.  She has not had 

any bowel movement.  



REVIEW OF SYSTEMS:  

GENERAL:  No fever or chills. 

CVS:  No chest pain or palpitations. 

RESPIRATORY:  No cough or expectoration. 



MEDICATIONS:  Reviewed as per MAR.  



OBJECTIVE  

VITAL SIGNS:  Temperature 99.1, pulse 80, respirations 19, blood pressure 

135/66, oxygen saturation 96% on room air. 

GENERAL:  Frail, thin body habitus, low muscle mass.  Not in any acute 

distress.  

HEENT:  Moist mucous membranes.  

CVS:  S1 and S2 regular. 

LUNGS:  Bilaterally grossly clear. 

ABDOMEN:  Soft.  Surgical dressing, incisional tenderness.  No rebound, 

rigidity or guarding, positive bowel sounds. 

EXTREMITIES:  Warm.  No leg edema.  



LABORATORY DATA:  WBC has gone up to 16.29 from 7.73.  Hemoglobin 11.7, 

hematocrit 38.6.  MCV 77.5 and platelet count 290,000.  Sodium 141, 

potassium 3.7, chloride 111, bicarb 15, BUN 11, creatinine 0.62.  Stool 

occult blood heme positive.  



IMPRESSION:  Right colon cancer, status post laparoscopic hemicolectomy, 

postoperative day #1.  Surgical pathology as well as colon mass biopsy 

performed on colonoscopy still pending.  



PLAN:  Postop care as per surgery.  CT scan with IV contrast revealed no 

distant metastasis, indeterminate adrenal lesions.  Recommend oncology 

consult as well.  Will wait for biopsy results.  









DD:  03/27/2018 19:19

DT:  03/27/2018 19:31

Job#:  [id number]

## 2018-03-28 VITALS — DIASTOLIC BLOOD PRESSURE: 60 MMHG | SYSTOLIC BLOOD PRESSURE: 113 MMHG

## 2018-03-28 VITALS — SYSTOLIC BLOOD PRESSURE: 104 MMHG | DIASTOLIC BLOOD PRESSURE: 52 MMHG

## 2018-03-28 VITALS — SYSTOLIC BLOOD PRESSURE: 105 MMHG | DIASTOLIC BLOOD PRESSURE: 53 MMHG

## 2018-03-28 VITALS — SYSTOLIC BLOOD PRESSURE: 171 MMHG | DIASTOLIC BLOOD PRESSURE: 74 MMHG

## 2018-03-28 RX ADMIN — SODIUM CHLORIDE SCH MLS/HR: 9 INJECTION, SOLUTION INTRAVENOUS at 09:38

## 2018-03-28 RX ADMIN — Medication SCH MG: at 09:00

## 2018-03-28 RX ADMIN — Medication PRN MG: at 12:39

## 2018-03-28 RX ADMIN — PANTOPRAZOLE SODIUM SCH MG: 40 TABLET, DELAYED RELEASE ORAL at 09:00

## 2018-03-28 RX ADMIN — SODIUM CHLORIDE PRN MG: 900 INJECTION INTRAVENOUS at 04:00

## 2018-03-28 RX ADMIN — Medication PRN MG: at 02:53

## 2018-03-28 RX ADMIN — ATENOLOL SCH MG: 50 TABLET ORAL at 09:00

## 2018-03-28 RX ADMIN — Medication SCH MG: at 17:00

## 2018-03-28 RX ADMIN — SODIUM CHLORIDE SCH MLS/HR: 9 INJECTION, SOLUTION INTRAVENOUS at 01:52

## 2018-03-28 RX ADMIN — Medication PRN MG: at 04:00

## 2018-03-28 RX ADMIN — Medication PRN MG: at 22:50

## 2018-03-28 RX ADMIN — Medication SCH MG: at 20:20

## 2018-03-28 NOTE — PROGRESS NOTE
DATE:  March 28, 2018 



SUBJECTIVE:  The patient reports mild incisional pain.  She is tolerating 

liquids.  She is asking when she can eat solid food.  She is passing 

flatus.  She has not had any bowel movement.  



REVIEW OF SYSTEMS:  

GENERAL:  No fever or chills.  

CVS:  No chest pain or palpitations. 

RESPIRATORY:  No cough or expectoration.  



MEDICATIONS:  As per MAR, reviewed.  



OBJECTIVE  

VITAL SIGNS:  Temperature 98.4, pulse 75, respirations 19, blood pressure 

113/60, oxygen saturation 98% on room air. 

GENERAL:  Not in any acute distress and sitting comfortably on the bedside 

couch. Thin body habitus with low muscle mass.  

HEENT:  Moist mucous membranes.  Anicteric sclerae. 

CVS:  S1 and S2 regular. 

LUNGS:  Bilaterally grossly clear.  

ABDOMEN:  Soft.  Nondistended.  Incisional tenderness on deep palpation 

without rebound, rigidity or guarding.  Positive bowel sounds. 

EXTREMITIES:  Warm.  No leg edema. 



LABORATORY DATA:  None done today.  Biopsy of the hepatic flexure mass, as 

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 

adenocarcinoma.  Gastric biopsy was negative for any H. pylori infection. 





PLAN:  Postop care for postop surgery.  Advancement of the diet to solid 

foot, also as per surgery.  Will wait for surgical pathology.  Recommend 

oncology should also be on board.  This I had discussed with Dr. Blackwood.  











DD:  03/28/2018 20:06

DT:  03/28/2018 20:10

Job#:  [id number]

## 2018-03-29 VITALS — SYSTOLIC BLOOD PRESSURE: 131 MMHG | DIASTOLIC BLOOD PRESSURE: 66 MMHG

## 2018-03-29 VITALS — SYSTOLIC BLOOD PRESSURE: 111 MMHG | DIASTOLIC BLOOD PRESSURE: 65 MMHG

## 2018-03-29 VITALS — DIASTOLIC BLOOD PRESSURE: 62 MMHG | SYSTOLIC BLOOD PRESSURE: 143 MMHG

## 2018-03-29 VITALS — DIASTOLIC BLOOD PRESSURE: 70 MMHG | SYSTOLIC BLOOD PRESSURE: 101 MMHG

## 2018-03-29 VITALS — SYSTOLIC BLOOD PRESSURE: 114 MMHG | DIASTOLIC BLOOD PRESSURE: 62 MMHG

## 2018-03-29 VITALS — DIASTOLIC BLOOD PRESSURE: 66 MMHG | SYSTOLIC BLOOD PRESSURE: 131 MMHG

## 2018-03-29 VITALS — DIASTOLIC BLOOD PRESSURE: 63 MMHG | SYSTOLIC BLOOD PRESSURE: 132 MMHG

## 2018-03-29 LAB
ANION GAP SERPL CALC-SCNC: 12 MMOL/L (ref 8–16)
ANISOCYTOSIS BLD QL SMEAR: (no result)
BASOPHILS # BLD AUTO: 0 10*3/UL (ref 0–0.1)
BASOPHILS NFR BLD AUTO: 0.6 % (ref 0–1)
BUN SERPL-MCNC: 12 MG/DL (ref 7–26)
BUN/CREAT SERPL: 20 (ref 6–25)
CALCIUM SERPL-MCNC: 8.8 MG/DL (ref 8.4–10.2)
CHLORIDE SERPL-SCNC: 108 MMOL/L (ref 98–107)
CO2 SERPL-SCNC: 20 MMOL/L (ref 22–29)
DEPRECATED NEUTROPHILS # BLD AUTO: 5.5 10*3/UL (ref 2.1–6.9)
EGFRCR SERPLBLD CKD-EPI 2021: > 60 ML/MIN (ref 60–?)
EOSINOPHIL # BLD AUTO: 0.1 10*3/UL (ref 0–0.4)
EOSINOPHIL NFR BLD AUTO: 1.8 % (ref 0–6)
ERYTHROCYTE [DISTWIDTH] IN CORD BLOOD: 29.1 % (ref 11.7–14.4)
GLUCOSE SERPLBLD-MCNC: 155 MG/DL (ref 74–118)
HCT VFR BLD AUTO: 44 % (ref 34.2–44.1)
HGB BLD-MCNC: 13.7 G/DL (ref 12–16)
LYMPHOCYTES # BLD: 0.7 10*3/UL (ref 1–3.2)
LYMPHOCYTES NFR BLD AUTO: 10 % (ref 18–39.1)
MCH RBC QN AUTO: 23.4 PG (ref 28–32)
MCHC RBC AUTO-ENTMCNC: 31.1 G/DL (ref 31–35)
MCV RBC AUTO: 75.2 FL (ref 81–99)
MONOCYTES # BLD AUTO: 0.5 10*3/UL (ref 0.2–0.8)
MONOCYTES NFR BLD AUTO: 6.6 % (ref 4.4–11.3)
NEUTS SEG NFR BLD AUTO: 80.6 % (ref 38.7–80)
PLAT MORPH BLD: (no result)
PLATELET # BLD AUTO: 303 X10E3/UL (ref 140–360)
PLATELET # BLD EST: ADEQUATE 10*3/UL
POIKILOCYTOSIS BLD QL SMEAR: SLIGHT
POTASSIUM SERPL-SCNC: 4 MMOL/L (ref 3.5–5.1)
RBC # BLD AUTO: 5.85 X10E6/UL (ref 3.6–5.1)
RBC MORPH BLD: (no result)
SODIUM SERPL-SCNC: 136 MMOL/L (ref 136–145)

## 2018-03-29 RX ADMIN — Medication SCH MG: at 20:20

## 2018-03-29 RX ADMIN — PANTOPRAZOLE SODIUM SCH MG: 40 TABLET, DELAYED RELEASE ORAL at 09:00

## 2018-03-29 RX ADMIN — Medication SCH MG: at 09:00

## 2018-03-29 RX ADMIN — SODIUM CHLORIDE PRN MG: 900 INJECTION INTRAVENOUS at 17:32

## 2018-03-29 RX ADMIN — Medication SCH MG: at 17:46

## 2018-03-29 RX ADMIN — Medication PRN MG: at 13:09

## 2018-03-29 RX ADMIN — SODIUM CHLORIDE PRN MG: 900 INJECTION INTRAVENOUS at 21:30

## 2018-03-29 RX ADMIN — ATENOLOL SCH MG: 50 TABLET ORAL at 12:00

## 2018-03-29 NOTE — DIAGNOSTIC IMAGING REPORT
EXAM: CT CHEST W

DATE: 3/28/2018 8:57 PM  

INDICATION:  Metastasis workup, recent removal tumor in transverse colon, POD 1

COMPARISON:  None

TECHNIQUE: Multidetector CT scanning of the chest was performed.  Coronal and

sagittal multiplanar reformations were obtained.    

IV Contrast: 100 ml Isovue 370/300



FINDINGS:

LUNGS AND PLEURA: There are small bilateral pleural effusions with associated

lower lobe and scattered lingular and right middle lobe atelectasis. There is

an irregular 3.1 cm opacity in the posterior right upper lobe with adjacent

small nodules. Nonspecific 4 mm right upper and middle lobe nodules (series 3

image 16, 60)



HEART, MEDIASTINUM, VESSELS: Bilateral thyroid nodules, largest 2.4 cm on the

left. Mild cardiomegaly with coronary artery and aortic atherosclerotic

disease. No suspicious adenopathy.



UPPER ABDOMEN: Pneumoperitoneum and mild to moderate visualized free fluid,

incompletely assessed, status post recent surgery.  Cholecystectomy, right

renal cyst, and bilateral adrenal nodules again noted. Stable 9 mm left liver

cyst. A 9 mm segment 3 low-density liver lesion (image 102) is more conspicuous

than on prior, which was degraded by motion in this region.  Probable right

flash fill hemangioma (image 99).



MUSCULOSKELETAL: No suspicious findings.



IMPRESSION:



1. Small bilateral effusions with scattered atelectasis.

2. Irregular 3.1 cm right upper lobe opacity and adjacent tiny nodules, most

likely infectious. Attention on follow-up.

3. Two nonspecific 4 mm right upper and middle lobe nodules.

4. Left liver subcentimeter low density lesion most likely a small metastasis. 

This is distinct from the left liver cyst.



Signed by: Dr Kassandra Dickey MD on 3/29/2018 12:46 AM

## 2018-03-29 NOTE — DIAGNOSTIC IMAGING REPORT
EXAM: Abdomen 1  View

INDICATION:     

\S\s/p right hemicolectomy, having nausea, minimal bowel sound

\[id number]

\S\2005

COMPARISON: CT dated 3/25/2018



FINDINGS:

Nonobstructive bowel gas pattern. Mild pneumoperitoneum, probably postsurgical,

evident by Rigler's sign in left abdomen.

Surgical clips overlying mid abdomen. There is also suture line in right

abdomen.

Contrast excretion from prior CT in the bladder. Questionable retained contrast

within bowel in left abdomen.

Upper abdomen is excluded from the image.

No acute osseous abnormality.



IMPRESSION:

1.  Very limited single view exam.

2.  Nonobstructive bowel gas pattern.

3.  Pneumoperitoneum, which is probably postsurgical.





Signed by: Dr. Milton Honeycutt MD on 3/29/2018 8:51 PM

## 2018-03-29 NOTE — PROGRESS NOTE
DATE:  March 29, 2018 



SUBJECTIVE:  The patient is not feeling good today.  She is nauseous.  She 

is not even able to tolerate liquids today.  



REVIEW OF SYSTEMS:  

GENERAL:  No fever or chills. 

CVS:  No chest pain or palpitations. 

RESPIRATORY:  No cough or expectoration. 



MEDICATIONS:  Reviewed as per MAR.  



PHYSICAL EXAMINATION 

VITAL SIGNS:  Temperature 99.3, pulse 76, respirations 18 to 19, blood 

pressure 111/65, oxygen saturation 95% on 2 liters of nasal cannula. 

GENERAL:  Not in any acute distress.  

HEENT:  Oral mucosa is moist.  Anicteric sclerae.  

CVS:  S1 and S2 regular with a 2/6 flow murmur at the apex. 

LUNGS:  Bilaterally grossly clear. 

ABDOMEN:  Incisional tenderness.  Midline surgical dressing.  Nondistended. 

 No rebound, rigidity or guarding.  Bowel sounds minimal.  

EXTREMITIES:  Warm with no leg edema.  



LABORATORY DATA:  WBC has come down to 6.79 from 16.29.  Hemoglobin 13.7, 

hematocrit 44, MCV 75.2, platelet count 303,000.  Sodium 136, potassium 

4.0, chloride 108, bicarb 20, BUN 12, creatinine 0.61.  PT 13.7, INR 1.14.  





The CT of the chest performed on 03/28/18 showed left liver subsegmental 

low density lesion most likely a small metastasis.  This is distinct from 

the left liver cyst.  Two nonspecific 4 mm right upper and middle lobe 

nodules.  Irregular 3.1 cm right upper lobe opacity and adjacent tiny 

nodule most likely infectious.  Attention on followup.  A small bilateral 

effusion with scattered atelectasis.   CT of the abdomen and pelvis done on 

03/25 showed bilateral indeterminate adrenal lesion.  There is no distinct 

metastasis.  



IMPRESSION:  The patient presented with anemia, heme positive stool.  

Colonoscopy showed right colon mass, status post laparoscopic right 

hemicolectomy.  CT of the abdomen with contrast showed indeterminate 

bilateral adrenal lesion without any distinct metastasis.  However, CT of 

the chest is suggestive of tiny metastasis in the left lobe of the liver.  

She also has lung nodules.  



PLAN:  From GI standpoint, the patient should be seen by oncology service 

as well.  Surgical pathology is still pending to determine the stage of the 

colon cancer.  



The patient was doing good yesterday.  She was tolerating clear liquids 

very well.  Today, she is having nausea.  Abdominal exam is also showing 

very minimal bowel sounds.  Therefore, will do an abdominal x-ray to rule 

out any postoperative ileus.  The rest of the postop care as per surgery. 









DD:  03/29/2018 19:41

DT:  03/29/2018 20:27

Job#:  [id number]

## 2018-03-30 VITALS — SYSTOLIC BLOOD PRESSURE: 138 MMHG | DIASTOLIC BLOOD PRESSURE: 65 MMHG

## 2018-03-30 VITALS — DIASTOLIC BLOOD PRESSURE: 62 MMHG | SYSTOLIC BLOOD PRESSURE: 141 MMHG

## 2018-03-30 VITALS — SYSTOLIC BLOOD PRESSURE: 127 MMHG | DIASTOLIC BLOOD PRESSURE: 58 MMHG

## 2018-03-30 VITALS — SYSTOLIC BLOOD PRESSURE: 118 MMHG | DIASTOLIC BLOOD PRESSURE: 58 MMHG

## 2018-03-30 VITALS — DIASTOLIC BLOOD PRESSURE: 72 MMHG | SYSTOLIC BLOOD PRESSURE: 147 MMHG

## 2018-03-30 VITALS — DIASTOLIC BLOOD PRESSURE: 77 MMHG | SYSTOLIC BLOOD PRESSURE: 145 MMHG

## 2018-03-30 RX ADMIN — METRONIDAZOLE SCH MG: 500 TABLET ORAL at 22:45

## 2018-03-30 RX ADMIN — PANTOPRAZOLE SODIUM SCH MG: 40 TABLET, DELAYED RELEASE ORAL at 09:07

## 2018-03-30 RX ADMIN — CHOLESTYRAMINE LIGHT PRN GM: 4 POWDER, FOR SUSPENSION ORAL at 23:00

## 2018-03-30 RX ADMIN — Medication SCH MG: at 19:18

## 2018-03-30 RX ADMIN — SODIUM CHLORIDE PRN MG: 900 INJECTION INTRAVENOUS at 06:49

## 2018-03-30 RX ADMIN — SODIUM CHLORIDE PRN MG: 900 INJECTION INTRAVENOUS at 02:13

## 2018-03-30 RX ADMIN — ATENOLOL SCH MG: 50 TABLET ORAL at 09:07

## 2018-03-31 VITALS — SYSTOLIC BLOOD PRESSURE: 136 MMHG | DIASTOLIC BLOOD PRESSURE: 66 MMHG

## 2018-03-31 VITALS — SYSTOLIC BLOOD PRESSURE: 126 MMHG | DIASTOLIC BLOOD PRESSURE: 65 MMHG

## 2018-03-31 VITALS — DIASTOLIC BLOOD PRESSURE: 59 MMHG | SYSTOLIC BLOOD PRESSURE: 121 MMHG

## 2018-03-31 VITALS — SYSTOLIC BLOOD PRESSURE: 108 MMHG | DIASTOLIC BLOOD PRESSURE: 54 MMHG

## 2018-03-31 VITALS — SYSTOLIC BLOOD PRESSURE: 124 MMHG | DIASTOLIC BLOOD PRESSURE: 56 MMHG

## 2018-03-31 VITALS — DIASTOLIC BLOOD PRESSURE: 63 MMHG | SYSTOLIC BLOOD PRESSURE: 134 MMHG

## 2018-03-31 LAB
ANION GAP SERPL CALC-SCNC: 15.1 MMOL/L (ref 8–16)
ANISOCYTOSIS BLD QL SMEAR: SLIGHT
BASOPHILS # BLD AUTO: 0.1 10*3/UL (ref 0–0.1)
BASOPHILS NFR BLD AUTO: 0.7 % (ref 0–1)
BUN SERPL-MCNC: 27 MG/DL (ref 7–26)
BUN/CREAT SERPL: 48 (ref 6–25)
CALCIUM SERPL-MCNC: 8.4 MG/DL (ref 8.4–10.2)
CHLORIDE SERPL-SCNC: 106 MMOL/L (ref 98–107)
CO2 SERPL-SCNC: 20 MMOL/L (ref 22–29)
DEPRECATED NEUTROPHILS # BLD AUTO: 5 10*3/UL (ref 2.1–6.9)
EGFRCR SERPLBLD CKD-EPI 2021: > 60 ML/MIN (ref 60–?)
EOSINOPHIL # BLD AUTO: 0.2 10*3/UL (ref 0–0.4)
EOSINOPHIL NFR BLD AUTO: 2.9 % (ref 0–6)
EOSINOPHIL NFR BLD MANUAL: 3 % (ref 0–7)
GLUCOSE SERPLBLD-MCNC: 88 MG/DL (ref 74–118)
HCT VFR BLD AUTO: 39 % (ref 34.2–44.1)
HGB BLD-MCNC: 12.2 G/DL (ref 12–16)
HYPOCHROMIA BLD QL SMEAR: SLIGHT
LYMPHOCYTES # BLD: 1 10*3/UL (ref 1–3.2)
LYMPHOCYTES NFR BLD AUTO: 14.3 % (ref 18–39.1)
LYMPHOCYTES NFR BLD MANUAL: 17 % (ref 19–48)
MCH RBC QN AUTO: 23.6 PG (ref 28–32)
MCHC RBC AUTO-ENTMCNC: 31.3 G/DL (ref 31–35)
MCV RBC AUTO: 75.6 FL (ref 81–99)
MICROCYTES BLD QL SMEAR: SLIGHT
MONOCYTES # BLD AUTO: 0.6 10*3/UL (ref 0.2–0.8)
MONOCYTES NFR BLD AUTO: 8.1 % (ref 4.4–11.3)
MONOCYTES NFR BLD MANUAL: 2 % (ref 3.4–9)
NEUTS BAND NFR BLD MANUAL: 1 %
NEUTS SEG NFR BLD AUTO: 73.3 % (ref 38.7–80)
NEUTS SEG NFR BLD MANUAL: 77 % (ref 40–74)
PLAT MORPH BLD: NORMAL
PLATELET # BLD AUTO: 297 X10E3/UL (ref 140–360)
PLATELET # BLD EST: ADEQUATE 10*3/UL
POTASSIUM SERPL-SCNC: 3.1 MMOL/L (ref 3.5–5.1)
RBC # BLD AUTO: 5.16 X10E6/UL (ref 3.6–5.1)
RBC MORPH BLD: NORMAL
SODIUM SERPL-SCNC: 138 MMOL/L (ref 136–145)

## 2018-03-31 RX ADMIN — ATENOLOL SCH MG: 50 TABLET ORAL at 08:36

## 2018-03-31 RX ADMIN — CHOLESTYRAMINE LIGHT PRN GM: 4 POWDER, FOR SUSPENSION ORAL at 05:37

## 2018-03-31 RX ADMIN — METRONIDAZOLE SCH MG: 500 TABLET ORAL at 05:37

## 2018-03-31 RX ADMIN — CHOLESTYRAMINE LIGHT PRN GM: 4 POWDER, FOR SUSPENSION ORAL at 22:00

## 2018-03-31 RX ADMIN — METRONIDAZOLE SCH MG: 500 TABLET ORAL at 13:39

## 2018-03-31 RX ADMIN — METRONIDAZOLE SCH MG: 500 TABLET ORAL at 22:00

## 2018-03-31 RX ADMIN — Medication PRN MG: at 18:02

## 2018-03-31 RX ADMIN — Medication SCH MG: at 08:35

## 2018-04-01 VITALS — SYSTOLIC BLOOD PRESSURE: 123 MMHG | DIASTOLIC BLOOD PRESSURE: 60 MMHG

## 2018-04-01 VITALS — DIASTOLIC BLOOD PRESSURE: 59 MMHG | SYSTOLIC BLOOD PRESSURE: 125 MMHG

## 2018-04-01 VITALS — DIASTOLIC BLOOD PRESSURE: 57 MMHG | SYSTOLIC BLOOD PRESSURE: 119 MMHG

## 2018-04-01 VITALS — SYSTOLIC BLOOD PRESSURE: 145 MMHG | DIASTOLIC BLOOD PRESSURE: 65 MMHG

## 2018-04-01 VITALS — DIASTOLIC BLOOD PRESSURE: 60 MMHG | SYSTOLIC BLOOD PRESSURE: 123 MMHG

## 2018-04-01 VITALS — SYSTOLIC BLOOD PRESSURE: 133 MMHG | DIASTOLIC BLOOD PRESSURE: 62 MMHG

## 2018-04-01 LAB
ANION GAP SERPL CALC-SCNC: 10.4 MMOL/L (ref 8–16)
ANISOCYTOSIS BLD QL SMEAR: (no result)
BASOPHILS # BLD AUTO: 0 10*3/UL (ref 0–0.1)
BASOPHILS NFR BLD AUTO: 0.6 % (ref 0–1)
BUN SERPL-MCNC: 22 MG/DL (ref 7–26)
BUN/CREAT SERPL: 43 (ref 6–25)
CALCIUM SERPL-MCNC: 8.1 MG/DL (ref 8.4–10.2)
CHLORIDE SERPL-SCNC: 105 MMOL/L (ref 98–107)
CO2 SERPL-SCNC: 22 MMOL/L (ref 22–29)
DEPRECATED NEUTROPHILS # BLD AUTO: 4.6 10*3/UL (ref 2.1–6.9)
EGFRCR SERPLBLD CKD-EPI 2021: > 60 ML/MIN (ref 60–?)
EOSINOPHIL # BLD AUTO: 0.3 10*3/UL (ref 0–0.4)
EOSINOPHIL NFR BLD AUTO: 3.8 % (ref 0–6)
ERYTHROCYTE [DISTWIDTH] IN CORD BLOOD: (no result) % (ref 11.7–14.4)
GLUCOSE SERPLBLD-MCNC: 107 MG/DL (ref 74–118)
HCT VFR BLD AUTO: 34.5 % (ref 34.2–44.1)
HGB BLD-MCNC: 11 G/DL (ref 12–16)
LYMPHOCYTES # BLD: 0.8 10*3/UL (ref 1–3.2)
LYMPHOCYTES NFR BLD AUTO: 12.7 % (ref 18–39.1)
MACROCYTES BLD QL SMEAR: (no result)
MCH RBC QN AUTO: 23.6 PG (ref 28–32)
MCHC RBC AUTO-ENTMCNC: 31.9 G/DL (ref 31–35)
MCV RBC AUTO: 73.9 FL (ref 81–99)
MICROCYTES BLD QL SMEAR: (no result)
MONOCYTES # BLD AUTO: 0.7 10*3/UL (ref 0.2–0.8)
MONOCYTES NFR BLD AUTO: 10.9 % (ref 4.4–11.3)
NEUTS SEG NFR BLD AUTO: 70.9 % (ref 38.7–80)
PLAT MORPH BLD: NORMAL
PLATELET # BLD AUTO: 289 X10E3/UL (ref 140–360)
PLATELET # BLD EST: ADEQUATE 10*3/UL
POIKILOCYTOSIS BLD QL SMEAR: (no result)
POTASSIUM SERPL-SCNC: 3.4 MMOL/L (ref 3.5–5.1)
RBC # BLD AUTO: 4.67 X10E6/UL (ref 3.6–5.1)
RBC MORPH BLD: (no result)
SODIUM SERPL-SCNC: 134 MMOL/L (ref 136–145)

## 2018-04-01 RX ADMIN — METRONIDAZOLE SCH MG: 500 TABLET ORAL at 05:25

## 2018-04-01 RX ADMIN — ATENOLOL SCH MG: 50 TABLET ORAL at 09:45

## 2018-04-01 RX ADMIN — SODIUM CHLORIDE SCH MLS/HR: 9 INJECTION, SOLUTION INTRAVENOUS at 20:00

## 2018-04-01 RX ADMIN — Medication SCH MG: at 21:00

## 2018-04-01 RX ADMIN — DICYCLOMINE HYDROCHLORIDE SCH MG: 20 TABLET ORAL at 18:41

## 2018-04-01 RX ADMIN — Medication SCH MG: at 09:45

## 2018-04-01 RX ADMIN — METRONIDAZOLE SCH MG: 500 TABLET ORAL at 21:57

## 2018-04-01 RX ADMIN — DICYCLOMINE HYDROCHLORIDE SCH MG: 20 TABLET ORAL at 21:00

## 2018-04-01 RX ADMIN — DICYCLOMINE HYDROCHLORIDE SCH MG: 20 TABLET ORAL at 09:45

## 2018-04-01 RX ADMIN — DICYCLOMINE HYDROCHLORIDE SCH MG: 20 TABLET ORAL at 13:28

## 2018-04-01 RX ADMIN — CHOLESTYRAMINE LIGHT PRN GM: 4 POWDER, FOR SUSPENSION ORAL at 04:00

## 2018-04-01 RX ADMIN — METRONIDAZOLE SCH MG: 500 TABLET ORAL at 13:28

## 2018-04-02 VITALS — SYSTOLIC BLOOD PRESSURE: 108 MMHG | DIASTOLIC BLOOD PRESSURE: 66 MMHG

## 2018-04-02 VITALS — DIASTOLIC BLOOD PRESSURE: 71 MMHG | SYSTOLIC BLOOD PRESSURE: 143 MMHG

## 2018-04-02 VITALS — SYSTOLIC BLOOD PRESSURE: 160 MMHG | DIASTOLIC BLOOD PRESSURE: 68 MMHG

## 2018-04-02 VITALS — SYSTOLIC BLOOD PRESSURE: 120 MMHG | DIASTOLIC BLOOD PRESSURE: 56 MMHG

## 2018-04-02 VITALS — DIASTOLIC BLOOD PRESSURE: 72 MMHG | SYSTOLIC BLOOD PRESSURE: 142 MMHG

## 2018-04-02 VITALS — DIASTOLIC BLOOD PRESSURE: 66 MMHG | SYSTOLIC BLOOD PRESSURE: 108 MMHG

## 2018-04-02 RX ADMIN — Medication SCH MG: at 15:00

## 2018-04-02 RX ADMIN — METRONIDAZOLE SCH MG: 500 TABLET ORAL at 23:08

## 2018-04-02 RX ADMIN — DICYCLOMINE HYDROCHLORIDE SCH MG: 20 TABLET ORAL at 13:00

## 2018-04-02 RX ADMIN — METRONIDAZOLE SCH MG: 500 TABLET ORAL at 05:11

## 2018-04-02 RX ADMIN — DICYCLOMINE HYDROCHLORIDE SCH MG: 20 TABLET ORAL at 17:37

## 2018-04-02 RX ADMIN — Medication SCH MG: at 08:45

## 2018-04-02 RX ADMIN — ATENOLOL SCH MG: 50 TABLET ORAL at 08:45

## 2018-04-02 RX ADMIN — Medication SCH MG: at 20:55

## 2018-04-02 RX ADMIN — METRONIDAZOLE SCH MG: 500 TABLET ORAL at 13:11

## 2018-04-02 RX ADMIN — DICYCLOMINE HYDROCHLORIDE SCH MG: 20 TABLET ORAL at 08:44

## 2018-04-02 RX ADMIN — Medication SCH MCG: at 08:45

## 2018-04-02 RX ADMIN — SODIUM CHLORIDE SCH MLS/HR: 9 INJECTION, SOLUTION INTRAVENOUS at 20:55

## 2018-04-02 RX ADMIN — DICYCLOMINE HYDROCHLORIDE SCH MG: 20 TABLET ORAL at 20:55

## 2018-04-03 VITALS — SYSTOLIC BLOOD PRESSURE: 133 MMHG | DIASTOLIC BLOOD PRESSURE: 74 MMHG

## 2018-04-03 VITALS — SYSTOLIC BLOOD PRESSURE: 121 MMHG | DIASTOLIC BLOOD PRESSURE: 57 MMHG

## 2018-04-03 VITALS — SYSTOLIC BLOOD PRESSURE: 113 MMHG | DIASTOLIC BLOOD PRESSURE: 58 MMHG

## 2018-04-03 VITALS — DIASTOLIC BLOOD PRESSURE: 58 MMHG | SYSTOLIC BLOOD PRESSURE: 128 MMHG

## 2018-04-03 LAB
ANION GAP SERPL CALC-SCNC: 11 MMOL/L (ref 8–16)
BASOPHILS # BLD AUTO: 0.1 10*3/UL (ref 0–0.1)
BASOPHILS NFR BLD AUTO: 0.7 % (ref 0–1)
BUN SERPL-MCNC: 10 MG/DL (ref 7–26)
BUN/CREAT SERPL: 19 (ref 6–25)
CALCIUM SERPL-MCNC: 8.4 MG/DL (ref 8.4–10.2)
CHLORIDE SERPL-SCNC: 106 MMOL/L (ref 98–107)
CO2 SERPL-SCNC: 25 MMOL/L (ref 22–29)
DEPRECATED NEUTROPHILS # BLD AUTO: 6 10*3/UL (ref 2.1–6.9)
EGFRCR SERPLBLD CKD-EPI 2021: > 60 ML/MIN (ref 60–?)
EOSINOPHIL # BLD AUTO: 0.2 10*3/UL (ref 0–0.4)
EOSINOPHIL NFR BLD AUTO: 2.6 % (ref 0–6)
ERYTHROCYTE [DISTWIDTH] IN CORD BLOOD: (no result) % (ref 11.7–14.4)
GLUCOSE SERPLBLD-MCNC: 95 MG/DL (ref 74–118)
HCT VFR BLD AUTO: 37.3 % (ref 34.2–44.1)
HGB BLD-MCNC: 11.7 G/DL (ref 12–16)
LYMPHOCYTES # BLD: 1.1 10*3/UL (ref 1–3.2)
LYMPHOCYTES NFR BLD AUTO: 13.4 % (ref 18–39.1)
MCH RBC QN AUTO: 23.5 PG (ref 28–32)
MCHC RBC AUTO-ENTMCNC: 31.4 G/DL (ref 31–35)
MCV RBC AUTO: 75.1 FL (ref 81–99)
MONOCYTES # BLD AUTO: 0.6 10*3/UL (ref 0.2–0.8)
MONOCYTES NFR BLD AUTO: 7.8 % (ref 4.4–11.3)
NEUTS SEG NFR BLD AUTO: 74 % (ref 38.7–80)
PLATELET # BLD AUTO: 345 X10E3/UL (ref 140–360)
POTASSIUM SERPL-SCNC: 4 MMOL/L (ref 3.5–5.1)
RBC # BLD AUTO: 4.97 X10E6/UL (ref 3.6–5.1)
SODIUM SERPL-SCNC: 138 MMOL/L (ref 136–145)

## 2018-04-03 RX ADMIN — DICYCLOMINE HYDROCHLORIDE SCH MG: 20 TABLET ORAL at 13:00

## 2018-04-03 RX ADMIN — METRONIDAZOLE SCH MG: 500 TABLET ORAL at 13:14

## 2018-04-03 RX ADMIN — Medication SCH MG: at 09:00

## 2018-04-03 RX ADMIN — Medication SCH MCG: at 09:00

## 2018-04-03 RX ADMIN — ATENOLOL SCH MG: 50 TABLET ORAL at 09:00

## 2018-04-03 RX ADMIN — DICYCLOMINE HYDROCHLORIDE SCH MG: 20 TABLET ORAL at 09:00

## 2018-04-03 NOTE — DISCHARGE SUMMARY
PRIMARY CARE PHYSICIAN:  Dr. Chente Cook.



CONSULTANTS:  

1. Dr. Melvin Kaye.  

2. Dr. Anastacio Almanza.  

3. Dr. Stan Gonzalez.

4. Dr. Adrian Ambriz.



FINAL DIAGNOSES:  

1. Anemia, status post multiple blood transfusions.

2. Status post colonoscopy with right colon mass, status post right 

hemicolectomy.

3. Diarrhea, resolved.

4. Anemia.

5. Possible metastasis with the liver and adrenal lesion.  Will need PET 

scan as an outpatient.



SUMMARY:  An 82-year-old female came in with profound anemia.  The patient 

is status post 3 units blood transfusion.  Her hemoglobin and hematocrit 

were 5.6 and 20.5 on admission.  The patient did receive 3 units of blood 

transfusion.  She underwent colonoscopy.  She had a right colon mass.  

Subsequently, she underwent right hemicolectomy done by Dr. Stan Gonzalez.  

Patient was stable postop but did have some diarrhea and some pain.  Workup 

from metastasis with adrenal lesion.  A CT scan of the abdomen and pelvis 

with adrenal and liver protocol inconclusive, however.  The patient is 

unable to get MRI due to her claustrophobia.



Dr. Almanza saw the patient and advised further followup as an outpatient for a 

PET scan for metastasis workup.  Patient is stable.  She will go home 

today.  She will follow up with Dr. Delmy Joseph, her network oncologist.  

She will follow up Dr. Stan Gonzalez for postoperative 

care.  Patient is stable, discharged home today.  Resume home medication, 

which is including Plavix.  Patient is stable, no sign of bleeding.  She is 

stable for followup.





 





DD:  04/03/2018 09:13

DT:  04/03/2018 13:26

Job#:  [id number]

## 2020-02-04 LAB
BASOPHILS # BLD AUTO: 0 10*3/UL (ref 0–0.1)
BASOPHILS NFR BLD AUTO: 0.8 % (ref 0–1)
DEPRECATED NEUTROPHILS # BLD AUTO: 3 10*3/UL (ref 2.1–6.9)
EOSINOPHIL # BLD AUTO: 0.1 10*3/UL (ref 0–0.4)
EOSINOPHIL NFR BLD AUTO: 1.3 % (ref 0–6)
ERYTHROCYTE [DISTWIDTH] IN CORD BLOOD: 12.6 % (ref 11.7–14.4)
HCT VFR BLD AUTO: 37.4 % (ref 34.2–44.1)
HGB BLD-MCNC: 12.3 G/DL (ref 12–16)
LYMPHOCYTES # BLD: 1.7 10*3/UL (ref 1–3.2)
LYMPHOCYTES NFR BLD AUTO: 31.6 % (ref 18–39.1)
MCH RBC QN AUTO: 29.9 PG (ref 28–32)
MCHC RBC AUTO-ENTMCNC: 32.9 G/DL (ref 31–35)
MCV RBC AUTO: 90.8 FL (ref 81–99)
MONOCYTES # BLD AUTO: 0.5 10*3/UL (ref 0.2–0.8)
MONOCYTES NFR BLD AUTO: 9.2 % (ref 4.4–11.3)
NEUTS SEG NFR BLD AUTO: 56.9 % (ref 38.7–80)
PLATELET # BLD AUTO: 254 X10E3/UL (ref 140–360)
RBC # BLD AUTO: 4.12 X10E6/UL (ref 3.6–5.1)

## 2020-02-06 ENCOUNTER — HOSPITAL ENCOUNTER (OUTPATIENT)
Dept: HOSPITAL 88 - OR | Age: 85
Discharge: HOME | End: 2020-02-06
Attending: INTERNAL MEDICINE
Payer: MEDICARE

## 2020-02-06 VITALS — SYSTOLIC BLOOD PRESSURE: 160 MMHG | DIASTOLIC BLOOD PRESSURE: 90 MMHG

## 2020-02-06 DIAGNOSIS — I25.10: ICD-10-CM

## 2020-02-06 DIAGNOSIS — Z01.810: ICD-10-CM

## 2020-02-06 DIAGNOSIS — Z98.0: ICD-10-CM

## 2020-02-06 DIAGNOSIS — Z85.038: ICD-10-CM

## 2020-02-06 DIAGNOSIS — K29.70: ICD-10-CM

## 2020-02-06 DIAGNOSIS — K64.8: ICD-10-CM

## 2020-02-06 DIAGNOSIS — K44.9: ICD-10-CM

## 2020-02-06 DIAGNOSIS — Z01.812: ICD-10-CM

## 2020-02-06 DIAGNOSIS — Z79.02: ICD-10-CM

## 2020-02-06 DIAGNOSIS — Z95.5: ICD-10-CM

## 2020-02-06 DIAGNOSIS — K21.9: ICD-10-CM

## 2020-02-06 DIAGNOSIS — Z88.6: ICD-10-CM

## 2020-02-06 DIAGNOSIS — K22.2: Primary | ICD-10-CM

## 2020-02-06 DIAGNOSIS — I10: ICD-10-CM

## 2020-02-06 DIAGNOSIS — D12.4: ICD-10-CM

## 2020-02-06 PROCEDURE — 85025 COMPLETE CBC W/AUTO DIFF WBC: CPT

## 2020-02-06 PROCEDURE — 88305 TISSUE EXAM BY PATHOLOGIST: CPT

## 2020-02-06 PROCEDURE — 45378 DIAGNOSTIC COLONOSCOPY: CPT

## 2020-02-06 PROCEDURE — 43249 ESOPH EGD DILATION <30 MM: CPT

## 2020-02-06 PROCEDURE — 43450 DILATE ESOPHAGUS 1/MULT PASS: CPT

## 2020-02-06 PROCEDURE — 93005 ELECTROCARDIOGRAM TRACING: CPT

## 2020-02-06 PROCEDURE — 43235 EGD DIAGNOSTIC BRUSH WASH: CPT

## 2020-02-06 PROCEDURE — 45380 COLONOSCOPY AND BIOPSY: CPT

## 2020-02-06 PROCEDURE — 36415 COLL VENOUS BLD VENIPUNCTURE: CPT

## 2020-02-06 NOTE — XMS REPORT
Summary of Care

                             Created on: 2019



ALBERTY, HOPE

External Reference #: [id number]

: 1935

Sex: Female



Demographics







                          Address                   [address] [place], [place] [id number]

 

                          Preferred Language        English

 

                          Marital Status            Unknown

 

                          Christian Affiliation     Unknown

 

                          Race                      White

 

                          Ethnic Group              Non-





Author







                          Author                    SISI POST M.D.

 

                          Organization              Unknown

 

                          Address                   [facility]



 

                          Phone                     Unavailable







Care Team Providers







                    Care Team Member Name    Role                Phone

 

                    SISI POST M.D.    Unavailable         Unavailable

 

                    HUGO BALLARD MD    Unavailable         Unavailable

 

                    SISI POST MD    Unavailable         Unavailable

 

                          Unavailable               Unavailable







Functional Status







                    Name                Dates               Details

 

                                        Functional status health issues are not documented

                                                    Status: 









                    Name                Dates               Details

 

                                        Cognitive status health issues are not documented

                                                    Status: 







Problems







                    Name                Dates               Details

 

                                        Hypersalivation (527.7, K11.7) 

                                                    Status: Active

 

                                        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 

                                                    Status: Active

 

                                        Bilateral impacted cerumen (380.4, H61.23) 

                                                    Status: Active

 

                                        Chronic otitis externa (380.23, H60.60) 

                                                    Status: Active







Medications







                    Name                Dates               Details

 

                                        Simvastatin 20 MG Oral Tablet



 









Active

Plavix 75 MG Oral Tablet

* 





                                         Refills: 0







Active

Pantoprazole Sodium 40 MG Oral Tablet Delayed Release

* 





                                         Refills: 0







Active

Calcium 600 MG Oral Tablet

* 





                                         Refills: 0







Active

Vitamin B-12 100 MCG Oral Tablet

* 





                                         Refills: 0







Active

Centrum Silver TABS

* 





                                         Refills: 0







Active

NIFEdipine CR 30 MG TBCR

* 





                                         Refills: 0







Active

Clopidogrel Bisulfate 75 MG Oral Tablet

* 





                                         Refills: 0







Active

Lumigan 0.03 % SOLN

* 





                                         Refills: 0







Active

Combigan 0.2-0.5 % Ophthalmic Solution

* 





                                         Refills: 0







Active

Iron TABS

* 





                                         Refills: 0







Active

Fluocinolone Acetonide 0.01 % Otic Oil

3 to 4 drops to both ears daily.

* 





                           Quantity: 1               Refills: 10









SISI POST M.D. * 





                                         Start : 24-Sep-2019





Active

20 ML Bottle





Allergies and Adverse Reactions







                    Name                Dates               Details

 

                    Codeine Derivatives (Allergy)                        Status: Active



 

                    iodine (Allergy)                        Status: Active









Past Medical History







                    Name                Dates               Details

 

                                        History of cardiac disorder (V12.50, Z86.79) 

                                                    Status: Resolved

 

                                        History of glaucoma (V12.49, Z86.69) 

                                                    Status: Resolved

 

                                        History of hypertension (V12.59, Z86.79) 

                                                    Status: Resolved







Procedures







                    Procedure           Dates               Details

 

                    History of Hysterectomy                        Completed 



 

                    History of Ear Surgery                        Completed 



 

                    History of Heart Surgery                        Completed 



 

                    History of Cholecystotomy                        Completed 









Immunization







                    Name                Dates               Details

 

                                        Immunizations not documented

                                                     







Family History







                    Name                Dates               Details

 

                                        Family history of cardiac disorder (V17.49, Z82.49) 

                                                    Status: Active

 

                                        Family history of malignant neoplasm (V16.9, Z80.9) 

                                                    Status: Active







Social History







                    Name                Dates               Details

 

                                        -

                                                    Status: 









                    Name                Dates               Details

 

                    Never smoker                             







Vital Signs







                Date            Test            Result          Details

 

                                                                 

 

                                        24-Sep-201910:15

                    BP Systolic         134 mm[Hg]          Status: 



 

                    BP Diastolic        60 mm[Hg]           Status: 



 

                    Heart Rate          66 /min             Status: 









Results







                Date            Description     Value           Details

 

                    Results not documented                         

 

                                                                 







Plan of Care







                    Name                Dates               Details

 

                                        Planned Observations 

 

                    Planned Goals not documented                         







Interventions Provided

Medication Changes* Fluocinolone Acetonide 0.01 % Otic Oil - Start

Plan* 1. Begin Dermotic. FU as needed.





Instructions







                    Name                Dates               Details

 

                                        Instructions not documented

                                                     







Encounters







                                        Appointment; OSEI GOMEZ M.D. 

Encounter Diagnosis: Problem not documented

                                        On: 16-May-2019 10:30



 

                                        Appointment; OSEI GOMEZ M.D. 

Encounter Diagnosis: Problem not documented

                                        On: 30-May-2019 13:00



 

                                        Appointment; SISI POST M.D. 

Encounter Diagnosis: Problem not documented

                                        On: 20-Aug-2019 10:15



 

                                        Appointment; SISI POST M.D. 

Encounter Diagnosis: Problem not documented

                                        On: 24-Sep-2019 9:45

## 2020-08-12 NOTE — DIAGNOSTIC IMAGING REPORT
PROCEDURE: CT ABDOMEN \T\ PELVIS W/WO CONTRAST

 

TECHNIQUE: 

The abdomen and pelvis were scanned utilizing a multidetector helical 

scanner from the diaphragm to the lesser trochanter before and after 

the IV administration of 100 cc of Isovue 370.  Coronal and sagittal 

multiplanar reformations were obtained.

 

COMPARISON: None.

 

INDICATIONS:   ANEMIA, ADRENAL AND LIVER LESION

 

FINDINGS:

LOWER THORAX: Small pleural effusions and adjacent lower lobe 

atelectasis.

 

HEPATOBILIARY: Left hepatic 1.1 cm cyst with tiny punctate 

calcification. No suspicious focal hepatic lesions.  No biliary ductal 

dilatation.

SPLEEN: No splenomegaly.

PANCREAS: No focal masses or ductal dilatation.

 

ADRENALS: Right adrenal 1.3 cm and left adrenal 0.9 cm nodules have 

densities on noncontrast CT which likely reflect lipid rich adenomas. 

The right nodule has a washout of 61% compatible with an adenoma, and 

the left nodule has a washout of 24% which is indeterminate.  

Evaluation is somewhat limited as there is hyperdensity in the right 

renal pelvis and renal cortices which may be related to test bolus 

injection. 

KIDNEYS/URETERS: No hydronephrosis, stones, or solid mass lesions. A 

right superior pole 0.9 cm hypodensity is indeterminate. 

PELVIC ORGANS/BLADDER: Lund catheter in place. Otherwise, 

unremarkable.

 

PERITONEUM / RETROPERITONEUM: Small amount of fluid in the left abdomen 

may represent complex fluid such as hemorrhage from recent surgery. 

Small amount of likely post-operative free air. No evidence of active 

contrast extravasation on arterial phase within the visualized portions 

of this hyperdense fluid. This fluid increases in density between the 

arterial phase (series 5 image 80) measuring 22 HU to 54 HU (series 8 

image 90). 

LYMPH NODES: No lymphadenopathy.

VESSELS: Replaced left hepatic artery arises from the left gastric 

artery. Scattered atherosclerotic calcifications.

 

GI TRACT: No distention or wall thickening. Postsurgical changes 

related to right hemicolectomy with ileal colonic anastomosis. Multiple 

dilated loops of small bowel without transition which gradually tapers 

to distal ileum likely related to ileus. Enteric contrast is noted in 

the colon.

 

BONES AND SOFT TISSUES: Midline laparotomy scar and multiple skin 

staples. Mild anasarca. 

 

IMPRESSION:

1. Status post right colonic resection and ileocolonic anastomosis.

2. Hyperdense fluid in the left abdomen may represent hemorrhage from 

recent surgery. No evidence of active arterial contrast extravasation 

within the visualized portions of this hyperdense fluid. However, 

increased density of this fluid between the arterial phase and the 15 

minute delayed phase may reflect a slow active bleed/oozing or 

clotting.  Consider serial hemoglobin and hematocrit and follow up CT 

in the setting of worsening anemia.

3. Multiple dilated loops of small bowel which gradually tapers to the 

distal ileum likely related to ileus. 

4. Bilateral adrenal nodules with density on the noncontrast phase 

compatible with lipid rich adenomas. Adrenal washout of the right 

adrenal nodule is concordant with an adenoma. Left adrenal washout 

indicates the nodule is indeterminate.  Given suggestion of test 

contrast bolus prior to acquisition of the non-contrast phase (contrast 

present in the urinary collecting system), evaluation is limited. 

Recommend follow up CT or MRI adrenal mass protocol on an outpatient 

basis. 

5. Left hepatic cyst. No suspicious hepatic lesions. 

6. Small pleural effusions, anasarca, and ascites. Small amount of 

likely post-operative pneumoperitoneum. 

1. 

 

Findings discussed with Dr. Blackwood on 3/30/2018 at 1640 hrs.

 

 

Dictated by:  Clint Sinclair M.D. on 3/30/2018 at 14:47     

Electronically approved by:  Clint Sinclair M.D. on 3/30/2018 at 14:47 Adequate: hears normal conversation without difficulty

## 2020-09-17 ENCOUNTER — HOSPITAL ENCOUNTER (OUTPATIENT)
Dept: HOSPITAL 88 - ER | Age: 85
Setting detail: OBSERVATION
LOS: 1 days | Discharge: HOME | End: 2020-09-18
Attending: INTERNAL MEDICINE | Admitting: INTERNAL MEDICINE
Payer: COMMERCIAL

## 2020-09-17 VITALS — SYSTOLIC BLOOD PRESSURE: 110 MMHG | DIASTOLIC BLOOD PRESSURE: 59 MMHG

## 2020-09-17 VITALS — DIASTOLIC BLOOD PRESSURE: 54 MMHG | SYSTOLIC BLOOD PRESSURE: 94 MMHG

## 2020-09-17 VITALS — DIASTOLIC BLOOD PRESSURE: 56 MMHG | SYSTOLIC BLOOD PRESSURE: 118 MMHG

## 2020-09-17 VITALS — DIASTOLIC BLOOD PRESSURE: 59 MMHG | SYSTOLIC BLOOD PRESSURE: 110 MMHG

## 2020-09-17 VITALS — DIASTOLIC BLOOD PRESSURE: 65 MMHG | SYSTOLIC BLOOD PRESSURE: 131 MMHG

## 2020-09-17 VITALS — DIASTOLIC BLOOD PRESSURE: 55 MMHG | SYSTOLIC BLOOD PRESSURE: 98 MMHG

## 2020-09-17 VITALS — HEIGHT: 60 IN | WEIGHT: 99 LBS | BODY MASS INDEX: 19.44 KG/M2

## 2020-09-17 DIAGNOSIS — I10: ICD-10-CM

## 2020-09-17 DIAGNOSIS — Z11.59: ICD-10-CM

## 2020-09-17 DIAGNOSIS — I25.10: ICD-10-CM

## 2020-09-17 DIAGNOSIS — E78.5: ICD-10-CM

## 2020-09-17 DIAGNOSIS — Z95.5: ICD-10-CM

## 2020-09-17 DIAGNOSIS — D64.9: ICD-10-CM

## 2020-09-17 DIAGNOSIS — Z88.5: ICD-10-CM

## 2020-09-17 DIAGNOSIS — R07.89: Primary | ICD-10-CM

## 2020-09-17 LAB
ALBUMIN SERPL-MCNC: 4.4 G/DL (ref 3.5–5)
ALBUMIN/GLOB SERPL: 1.6 {RATIO} (ref 0.8–2)
ALP SERPL-CCNC: 78 IU/L (ref 40–150)
ALT SERPL-CCNC: 10 IU/L (ref 0–55)
ANION GAP SERPL CALC-SCNC: 13.7 MMOL/L (ref 8–16)
BASOPHILS # BLD AUTO: 0 10*3/UL (ref 0–0.1)
BASOPHILS NFR BLD AUTO: 0.6 % (ref 0–1)
BUN SERPL-MCNC: 14 MG/DL (ref 7–26)
BUN/CREAT SERPL: 19 (ref 6–25)
CALCIUM SERPL-MCNC: 9.5 MG/DL (ref 8.4–10.2)
CHLORIDE SERPL-SCNC: 105 MMOL/L (ref 98–107)
CK MB SERPL-MCNC: 0.8 NG/ML (ref 0–5)
CK MB SERPL-MCNC: 1.1 NG/ML (ref 0–5)
CK MB SERPL-MCNC: 1.2 NG/ML (ref 0–5)
CK SERPL-CCNC: 43 IU/L (ref 29–168)
CK SERPL-CCNC: 66 IU/L (ref 29–168)
CK SERPL-CCNC: 70 IU/L (ref 29–168)
CO2 SERPL-SCNC: 25 MMOL/L (ref 22–29)
DEPRECATED APTT PLAS QN: 26.3 SECONDS (ref 23.8–35.5)
DEPRECATED INR PLAS: 0.85
DEPRECATED NEUTROPHILS # BLD AUTO: 2.8 10*3/UL (ref 2.1–6.9)
EGFRCR SERPLBLD CKD-EPI 2021: > 60 ML/MIN (ref 60–?)
EOSINOPHIL # BLD AUTO: 0.1 10*3/UL (ref 0–0.4)
EOSINOPHIL NFR BLD AUTO: 1.8 % (ref 0–6)
ERYTHROCYTE [DISTWIDTH] IN CORD BLOOD: 12.7 % (ref 11.7–14.4)
GLOBULIN PLAS-MCNC: 2.7 G/DL (ref 2.3–3.5)
GLUCOSE SERPLBLD-MCNC: 120 MG/DL (ref 74–118)
HCT VFR BLD AUTO: 38.6 % (ref 34.2–44.1)
HGB BLD-MCNC: 12.9 G/DL (ref 12–16)
LYMPHOCYTES # BLD: 1.7 10*3/UL (ref 1–3.2)
LYMPHOCYTES NFR BLD AUTO: 33.1 % (ref 18–39.1)
MCH RBC QN AUTO: 29.6 PG (ref 28–32)
MCHC RBC AUTO-ENTMCNC: 33.4 G/DL (ref 31–35)
MCV RBC AUTO: 88.5 FL (ref 81–99)
MONOCYTES # BLD AUTO: 0.5 10*3/UL (ref 0.2–0.8)
MONOCYTES NFR BLD AUTO: 9 % (ref 4.4–11.3)
NEUTS SEG NFR BLD AUTO: 55.3 % (ref 38.7–80)
PLATELET # BLD AUTO: 270 X10E3/UL (ref 140–360)
POTASSIUM SERPL-SCNC: 3.7 MMOL/L (ref 3.5–5.1)
PROTHROMBIN TIME: 12.1 SECONDS (ref 11.9–14.5)
RBC # BLD AUTO: 4.36 X10E6/UL (ref 3.6–5.1)
SODIUM SERPL-SCNC: 140 MMOL/L (ref 136–145)

## 2020-09-17 PROCEDURE — 82553 CREATINE MB FRACTION: CPT

## 2020-09-17 PROCEDURE — 85730 THROMBOPLASTIN TIME PARTIAL: CPT

## 2020-09-17 PROCEDURE — 71045 X-RAY EXAM CHEST 1 VIEW: CPT

## 2020-09-17 PROCEDURE — 93306 TTE W/DOPPLER COMPLETE: CPT

## 2020-09-17 PROCEDURE — 36415 COLL VENOUS BLD VENIPUNCTURE: CPT

## 2020-09-17 PROCEDURE — 93005 ELECTROCARDIOGRAM TRACING: CPT

## 2020-09-17 PROCEDURE — 80061 LIPID PANEL: CPT

## 2020-09-17 PROCEDURE — 85025 COMPLETE CBC W/AUTO DIFF WBC: CPT

## 2020-09-17 PROCEDURE — 85610 PROTHROMBIN TIME: CPT

## 2020-09-17 PROCEDURE — 80053 COMPREHEN METABOLIC PANEL: CPT

## 2020-09-17 PROCEDURE — 84484 ASSAY OF TROPONIN QUANT: CPT

## 2020-09-17 PROCEDURE — 83880 ASSAY OF NATRIURETIC PEPTIDE: CPT

## 2020-09-17 PROCEDURE — 93017 CV STRESS TEST TRACING ONLY: CPT

## 2020-09-17 PROCEDURE — 99284 EMERGENCY DEPT VISIT MOD MDM: CPT

## 2020-09-17 PROCEDURE — 78452 HT MUSCLE IMAGE SPECT MULT: CPT

## 2020-09-17 PROCEDURE — 82550 ASSAY OF CK (CPK): CPT

## 2020-09-17 RX ADMIN — Medication SCH MCG: at 09:50

## 2020-09-17 RX ADMIN — Medication SCH MG: at 09:50

## 2020-09-17 RX ADMIN — Medication SCH MG: at 17:24

## 2020-09-17 RX ADMIN — CLOPIDOGREL BISULFATE SCH MG: 75 TABLET, FILM COATED ORAL at 09:50

## 2020-09-17 RX ADMIN — PANTOPRAZOLE SODIUM SCH MG: 40 TABLET, DELAYED RELEASE ORAL at 09:50

## 2020-09-17 RX ADMIN — BRIMONIDINE TARTRATE, TIMOLOL MALEATE SCH DROP: 2; 5 SOLUTION/ DROPS OPHTHALMIC at 09:38

## 2020-09-17 RX ADMIN — FAMOTIDINE SCH MG: 20 TABLET, FILM COATED ORAL at 09:50

## 2020-09-17 RX ADMIN — BRIMONIDINE TARTRATE, TIMOLOL MALEATE SCH DROP: 2; 5 SOLUTION/ DROPS OPHTHALMIC at 17:24

## 2020-09-17 NOTE — NUR
Received the patient to the unit from er in a stretcher.aaox3.no resp.distress.no chest pain 
right now.oriented to the unit.iv to Left ac #18 g patent.tele#15 is in place.bed alarm 
on.bed locked and in lowest position.phone and call light within reach.instructed to call 
for assistance as needed.

## 2020-09-17 NOTE — EMERGENCY DEPARTMENT NOTE
History of Present Illnes


History of Present Illness


Chief Complaint:  Chest Pain


History of Present Illness


This is a 84 year old  female WITH H/O CAD AND A STENT PLACED 20 YEARS 

AGO PRESENTS WITH C/O INTERMITTENT CHEST PAIN FOR PAST 3 DAYS, STATES THIS 

MORNING WHEN IT HAPPENED


SHE ALSO HAD PAIN IN HER LEFT SHOULDER. CHEST PAIN RESOLVED AT THIS TIME, LASTED

ABOUT 10 MINUTES TONIGHT, PT HAS NOT SEEN A CARDIOLOGIST IN A YEAR AS HER C

ARDIOLOGIST DR RENEE PASSED


AWAY LAST YEAR. PT DENIES FEVER, DENIES SOB, DENIES DIAPHORESIS. .


Historian:  Patient


Arrival Mode:  Car


 Required:  No


Onset (how long ago):  day(s) (3)


Location:  CHEST


Quality:  PAIN/DISCOMFORT


Radiation:  Reports extremity (LEFT SHOULDER)


Severity:  moderate


Onset quality:  sudden


Duration (how long):  day(s) (3)


Timing of current episode:  intermittent


Progression:  resolved


Chronicity:  recurrent


Context:  Denies recent illness, Denies recent surgery, Denies trauma/injury


Relieving factors:  none


Exacerbating factors:  none


Associated symptoms:  Reports denies other symptoms


Treatments prior to arrival:  none





Past Medical/Family History


Physician Review


I have reviewed the patient's past medical and family history.  Any updates have

been documented here.





Past Medical History


Recent Fever:  No


Clinical Suspicion of Infectio:  No


New/Unexplained Change in Ment:  No


Past Medical History:  Hypertension, CAD, Anemia


Other Medical History:  


ANEMIA


Past Surgical History:  Cholecysctectomy, Appendectomy, T&A, PCI, Cataract 

Removal


Other Surgery:  


CARDIAC STENT





Social History


Smoking Cessation:  Never Smoker


Alcohol Use:  None


Any Illegal Drug Use:  No





Family History


Family history of heart diseas:  Yes


Other family history


HTN





Other


Last Tetanus:  UNK





Review of Systems


Review of Systems


Constitutional:  Reports no symptoms


EENTM:  Reports no symptoms


Cardiovascular:  Reports as per HPI


Respiratory:  Reports no symptoms


Gastrointestinal:  Reports no symptoms


Genitourinary:  Reports no symptoms


Musculoskeletal:  Reports no symptoms


Integumentary:  Reports no symptoms


Neurological:  Reports no symptoms


Psychological:  Reports no symptoms


Endocrine:  Reports no symptoms


Hematological/Lymphatic:  Reports no symptoms





Physical Exam


Related Data


Allergies:  


Coded Allergies:  


     codeine (Verified  Allergy, Unknown, 3/22/18)


Triage Vital Signs





Vital Signs








  Date Time  Temp Pulse Resp B/P (MAP) Pulse Ox O2 Delivery O2 Flow Rate FiO2


 


9/17/20 04:19 98.5 67 16 160/67 98 Room Air  








Vital signs reviewed:  Yes





Physical Exam


CONSTITUTIONAL





Constitutional:  Present well-developed, Present well-nourished


HENT


HENT:  Present normocephalic, Present atraumatic, Present oropharynx 

clear/moist, Present nose normal


HENT L/R:  Present left ext ear normal, Present right ext ear normal


EYES





Eyes:  Reports PERRL, Reports conjunctivae normal


NECK


Neck:  Present ROM normal


PULMONARY


Pulmonary:  Present effort normal, Present breath sounds normal


CARDIOVASCULAR





Cardiovascular:  Present regular rhythm, Present heart sounds normal, Present 

capillary refill normal, Present normal rate


GASTROINTESTINAL





Abdominal:  Present soft, Present nontender, Present bowel sounds normal


GENITOURINARY





Genitourinary:  Present exam deferred


SKIN


Skin:  Present warm, Present dry


MUSCULOSKELETAL





Musculoskeletal:  Present ROM normal


NEUROLOGICAL





Neurological:  Present alert, Present oriented x 3, Present no gross motor or 

sensory deficits


PSYCHOLOGICAL


Psychological:  Present mood/affect normal, Present judgement normal





Results


Laboratory


Laboratory





Laboratory Tests








Test


 9/17/20


04:24


 


White Blood Count


 5.10 x10e3/uL


(4.8-10.8)


 


Red Blood Count


 4.36 x10e6/uL


(3.6-5.1)


 


Hemoglobin


 12.9 g/dL


(12.0-16.0)


 


Hematocrit


 38.6 %


(34.2-44.1)


 


Mean Corpuscular Volume


 88.5 fL


(81-99)


 


Mean Corpuscular Hemoglobin


 29.6 pg


(28-32)


 


Mean Corpuscular Hemoglobin


Concent 33.4 g/dL


(31-35)


 


Red Cell Distribution Width


 12.7 %


(11.7-14.4)


 


Platelet Count


 270 x10e3/uL


(140-360)


 


Neutrophils (%) (Auto)


 55.3 %


(38.7-80.0)


 


Lymphocytes (%) (Auto)


 33.1 %


(18.0-39.1)


 


Monocytes (%) (Auto)


 9.0  %


(4.4-11.3)


 


Eosinophils (%) (Auto)


 1.8 %


(0.0-6.0)


 


Basophils (%) (Auto)


 0.6 %


(0.0-1.0)


 


Neutrophils # (Auto) 2.8 (2.1-6.9) 


 


Lymphocytes # (Auto) 1.7 (1.0-3.2) 


 


Monocytes # (Auto) 0.5 (0.2-0.8) 


 


Eosinophils # (Auto) 0.1 (0.0-0.4) 


 


Basophils # (Auto) 0.0 (0.0-0.1) 


 


Absolute Immature Granulocyte


(auto 0.01 x10e3/uL


(0-0.1)


 


Prothrombin Time


 12.1 seconds


(11.9-14.5)


 


Prothromb Time International


Ratio 0.85 





 


Activated Partial


Thromboplast Time 26.3 seconds


(23.8-35.5)


 


Sodium Level


 140 mmol/L


(136-145)


 


Potassium Level


 3.7 mmol/L


(3.5-5.1)


 


Chloride Level


 105 mmol/L


()


 


Carbon Dioxide Level


 25 mmol/L


(22-29)


 


Anion Gap


 13.7 mmol/L


(8-16)


 


Blood Urea Nitrogen


 14 mg/dL


(7-26)


 


Creatinine


 0.75 mg/dL


(0.57-1.11)


 


Estimat Glomerular Filtration


Rate > 60 ML/MIN


(60-)


 


BUN/Creatinine Ratio 19 (6-25) 


 


Glucose Level


 120 mg/dL


()


 


Calcium Level


 9.5 mg/dL


(8.4-10.2)


 


Total Bilirubin


 0.7 mg/dL


(0.2-1.2)


 


Aspartate Amino Transf


(AST/SGOT) 16 IU/L (5-34) 





 


Alanine Aminotransferase


(ALT/SGPT) 10 IU/L (0-55) 





 


Alkaline Phosphatase


 78 IU/L


()


 


Creatine Kinase


 43 IU/L


()


 


Creatine Kinase MB


 0.80 ng/mL


(0-5.0)


 


Troponin I


 0.006 ng/mL


(0-0.300)


 


B-Type Natriuretic Peptide


 49.7 pg/mL


(0-100)


 


Total Protein


 7.1 g/dL


(6.5-8.1)


 


Albumin


 4.4 g/dL


(3.5-5.0)


 


Globulin


 2.7 g/dL


(2.3-3.5)


 


Albumin/Globulin Ratio 1.6 (0.8-2.0) 








Laboratory Tests








Test


 9/17/20


04:24








Lab results reviewed:  Yes





Imaging


Imaging results reviewed:  Yes


Impressions


Procedure: [id number] DX/CHEST SINGLE (PORTABLE)


Exam Date:                             Exam Time: 








                              REPORT STATUS: Signed





EXAMINATION:  CHEST SINGLE (PORTABLE)    





INDICATION: CHEST PAIN





COMPARISON:  Chest CT dated 3/28/2018. Chest radiograph dated 3/22/2018.


     


FINDINGS:    





Heart is not enlarged. Pulmonary vasculature is within normal limits. Cardiac


stents.





Stable appearance of the left base which correlates with epicardial fat-pad on


prior CT.





No consolidation. No pleural effusion. No pneumothorax.





Cholecystectomy clips.





IMPRESSION: 





No acute thoracic radiographic abnormality.








Signed by: Berny Pro MD on 9/17/2020 4:59 AM








Dictated By: BERNY PRO MD


Electronically Signed By: BERNY PRO MD on 09/17/20 0459


Transcribed By: DOMINIC on 09/17/20 0459 








COPY TO:   SISI COOK MD~





Procedures


12 Lead ECG Interpretation


ECG Interpretation :  


   ECG:  ECG 1


   :  Interpreted by ED physician


   Date:  Sep 17, 2020


   Time:  04:23


   Rhythm:  sinus rhythm


   Rate:  normal


   BPM:  72


   QRS axis:  normal


   ST segments normal:  No (NONSPECIFIC ST CHANGES)


   T waves normal:  Yes


   Other findings:  no other findings


   Clinical Impression:  abnormal ECG





Assessment & Plan


Medical Decision Making


[facility]


PT WITH H/O CAD AND PCI WITH INTERMITTENT CHEST PAIN FOR PAST 3 DAYS





CBC, CMP, EKG, CARDIAC ENZYMES, BNP, CXR ORDERED TO EVAL FOR MYOCARDIAL 

INFARCTION, ELECTROLYTE ABNORMALITY, INTRATHORACIC ABNORMALITY, 





I SPOKE WITH DR ANNE MARIE RONDON IN OBS, I LEFT  A MESSAGE FOR DR DALTON ZENDEJAS





Assessment & Plan


Final Impression:  


(1) Chest pain


Depart Disposition:  ADMITTED


Last Vital Signs











  Date Time  Temp Pulse Resp B/P (MAP) Pulse Ox O2 Delivery O2 Flow Rate FiO2


 


9/17/20 04:19 98.5 67 16 160/67 98 Room Air  








Home Meds


Reported Medications


Clopidogrel Bisulfate* (PLAVIX) 75 Mg Tablet, 75 MG PO DAILY, #30 TAB


   2/5/20


Nifedipine (NIFEDIPINE ER) 30 Mg Tab.er.24, MG PO DAILY, #30


   4/3/18


Pantoprazole Sodium* (PROTONIX) 40 Mg Tablet.dr, 40 MG PO DAILY, TAB


   3/23/18


Simvastatin (SIMVASTATIN) 20 Mg Tablet, 20 MG PO 2100, EA


   3/23/18


Medications in the ED





Aspirin 81 mg PRN  ONCE PO ;  Start 9/17/20 at 04:30;  Stop 9/17/20 at 04:31;  

Status UNV











SISI COOK MD        Sep 17, 2020 04:38

## 2020-09-17 NOTE — DIAGNOSTIC IMAGING REPORT
EXAMINATION:  CHEST SINGLE (PORTABLE)    



INDICATION: CHEST PAIN



COMPARISON:  Chest CT dated 3/28/2018. Chest radiograph dated 3/22/2018.

     

FINDINGS:    



Heart is not enlarged. Pulmonary vasculature is within normal limits. Cardiac

stents.



Stable appearance of the left base which correlates with epicardial fat-pad on

prior CT.



No consolidation. No pleural effusion. No pneumothorax.



Cholecystectomy clips.



IMPRESSION: 



No acute thoracic radiographic abnormality.





Signed by: Westley Mccormack MD on 9/17/2020 4:59 AM

## 2020-09-17 NOTE — HISTORY AND PHYSICAL
PRIMARY CARE PHYSICIAN:  Dr. Chente Cook.

 

CONSULTANT:  Dr. Stan Martin.

 

CHIEF COMPLAINT:  Atypical chest pain.

 

HISTORY OF PRESENT ILLNESS:  The patient is an 84-year-old female with coronary artery

disease, remote stent back in 1990s.  The patient also has hypertension and

dyslipidemia, came in with atypical chest pain.  She is allergic to codeine.  Cardiac

enzymes negative.  Lab work otherwise unremarkable.  Chest x-ray unremarkable.  The

patient is pending for stress test. 

 

PAST MEDICAL HISTORY:  Coronary artery disease with previous stent, on Plavix and

aspirin.  This was in 1990s, done by  __________. 

 

Hypertension, dyslipidemia, and cataracts.  Chronic anemia.  History of right transverse

colon mass, status post right colectomy with anastomosis.  She had gallbladder surgery.

Complete abdominal hysterectomy. 

 

SOCIAL HISTORY:  The patient does not smoke or use alcohol.  No regular drugs.

 

ALLERGIES:  CODEINE.

 

HOME MEDICATIONS:  List is reviewed. 

 

She is on Lumigan, Combigan, vitamin D with calcium, Plavix, B12, Pepcid, nifedipine ER,

Protonix, simvastatin. 

 

PHYSICAL EXAMINATION:

VITAL SIGNS:  Temperature is 98, blood pressure 110/59, pulse rate is 79, respirations

18. 

GENERAL:  The patient is not in acute distress. 

HEENT:  Normocephalic and atraumatic.  Anicteric. 

NECK:  Supple grossly. 

PULMONARY:  Diminished breath sounds. 

CARDIOVASCULAR:  Regular rhythm. 

ABDOMEN:  Soft and unremarkable. 

EXTREMITIES:  No cyanosis or edema. 

NEUROLOGIC:  No gross focal deficit.

LABORATORY DATA:  Sodium is 140, potassium 3.7, chloride 105, bicarb 25, BUN is 14,

creatinine 0.7, glucose is 120. 

 

Cardiac enzymes negative. 

 

CK is negative. 

 

WBC 5, hemoglobin 13, hematocrit 38, and platelets 270. 

 

Serology; coronavirus PCR pending.  Coagulation is normal. 

 

Chest x-ray is normal.

 

IMPRESSION:  

1. Chest pain associated with history of coronary stent back in 1999.

2. Multiple chronic baseline problems including hypertension.

 

PLAN:  Cardiology consultation with Dr. Stan Martin.  2D echocardiogram.  Resume home

medication.  We will follow up on patient recommendation. 

 

 

 

 

______________________________

MD SHANA Shelley/JASON

D:  09/17/2020 08:56:51

T:  09/17/2020 09:44:48

Job #:  [id number]

## 2020-09-17 NOTE — CONSULTATION
DATE OF CONSULTATION:  

 

Cardiology Consult 

 

HISTORY OF PRESENT ILLNESS:  Abi Flores is an 84-year-old female with primary history

of hypertension, hyperlipidemia, CAD with coronary stent placement in 1999, colon mass,

status post hemicolectomy in 2018, admitted complaining of chest pain.  She describes

this chest pressure, intensity 4/10, radiating to her shoulders that started few hours

before prior to admission.  The patient reports the chest pain woke her up early morning

and relieved with rest.  The patient denies any other symptoms.  Denies shortness of

breath, dizziness, palpitations, nausea, or vomiting. 

 

PAST MEDICAL HISTORY:  Hypertension, hyperlipidemia, CAD with previous stent (on Plavix

and aspirin), chronic anemia, right transverse colon mass status post resection or

hemicolectomy. 

 

PAST SURGICAL HISTORY:  Coronary stents in 1999, hemicolectomy in 2018, cholecystectomy,

and hysterectomy. 

 

SOCIAL HISTORY:  The patient does not smoke or use alcohol or illicit drugs.

 

FAMILY HISTORY:  No known family history of coronary artery disease.

 

ALLERGIES:  CODEINE.

 

HOME MEDICATIONS:  Clopidogrel 75 mg p.o. daily, nifedipine 30 mg p.o. daily,

simvastatin 20 mg p.o. daily, pantoprazole 40 mg p.o. daily, famotidine 20 mg p.o.

daily, and vitamin B12 1000 mcg p.o. daily. 

 

PHYSICAL EXAMINATION:

VITAL SIGNS:  Temperature 97.9, pulse of 60, blood pressure is 110/59, and pulse

oximetry 100% on room air. 

GENERAL:  The patient is well-developed and well-nourished, in no acute respiratory

distress. 

SKIN:  Normal in appearance, texture, and temperature.  Warm and dry. 

HEENT:  The patient's cranium is normocephalic and atraumatic.  Pupils are equally round

and reactive to light and accommodation. 

NECK:  Supple.  Full range of motion.  No cervical lymphadenopathy.  No thyromegaly.

Carotid upstroke is normal bilaterally without bruits.  No JVD. 

RESPIRATORY:  Normal respiratory effort.  Lungs are clear to auscultation bilaterally.

No wheezing, rhonchi, or rales. 

CARDIOVASCULAR:  S1 and S2 are audible.  Regular rate and rhythm.  No significant

murmurs heard. 

GI:  Soft, nontender, and nondistended.  Bowel sounds are present. 

EXTREMITIES:  No cyanosis.  No clubbing.  No edema. 

NEUROVASCULAR:  Motor is intact.  Pulses are palpable 2+ throughout. 

NEUROLOGIC:  Motor and sensory examination of upper and lower extremities are normal.

ASSESSMENT AND PLAN:  The patient is an 84-year-old admitted with atypical chest pain.

Initial cardiac enzymes negative.  EKG is unremarkable.  Chest x-ray is unremarkable. 

1. Monitor on telemetry.

2. Obtain echocardiogram to evaluate valves and LV function.

3. Restart cardiac medications, aspirin, Plavix, and statin.

4. We will arrange for Lexiscan nuclear stress test to exclude ischemia. 

Thank you for this consultation.  We will continue to follow the patient and we will

evaluate for any further need of any cardiac intervention. 

 

 

Dictated by Ashley Chan NP

 

______________________________

MD CHRIS Bush/JASON

D:  09/17/2020 11:05:43

T:  09/17/2020 11:55:27

Job #:  [id number]

## 2020-09-18 VITALS — DIASTOLIC BLOOD PRESSURE: 65 MMHG | SYSTOLIC BLOOD PRESSURE: 115 MMHG

## 2020-09-18 VITALS — DIASTOLIC BLOOD PRESSURE: 65 MMHG | SYSTOLIC BLOOD PRESSURE: 135 MMHG

## 2020-09-18 VITALS — DIASTOLIC BLOOD PRESSURE: 68 MMHG | SYSTOLIC BLOOD PRESSURE: 133 MMHG

## 2020-09-18 LAB
CHOLEST SERPL-MCNC: 143 MD/DL (ref 0–199)
CHOLEST/HDLC SERPL: 2.7 {RATIO} (ref 3–3.6)
HDLC SERPL-MSCNC: 53 MG/DL (ref 40–60)
LDLC SERPL CALC-MCNC: 69 MG/DL (ref 60–130)
TRIGL SERPL-MCNC: 103 MG/DL (ref 0–149)

## 2020-09-18 RX ADMIN — BRIMONIDINE TARTRATE, TIMOLOL MALEATE SCH DROP: 2; 5 SOLUTION/ DROPS OPHTHALMIC at 10:10

## 2020-09-18 RX ADMIN — Medication SCH MG: at 09:00

## 2020-09-18 RX ADMIN — CLOPIDOGREL BISULFATE SCH MG: 75 TABLET, FILM COATED ORAL at 12:06

## 2020-09-18 RX ADMIN — FAMOTIDINE SCH MG: 20 TABLET, FILM COATED ORAL at 09:00

## 2020-09-18 RX ADMIN — PANTOPRAZOLE SODIUM SCH MG: 40 TABLET, DELAYED RELEASE ORAL at 07:30

## 2020-09-18 RX ADMIN — CLOPIDOGREL BISULFATE SCH MG: 75 TABLET, FILM COATED ORAL at 09:00

## 2020-09-18 RX ADMIN — Medication SCH MCG: at 12:01

## 2020-09-18 RX ADMIN — Medication SCH MG: at 12:08

## 2020-09-18 RX ADMIN — Medication SCH MCG: at 09:00

## 2020-09-18 NOTE — XMS REPORT
Continuity of Care Document

                             Created on: 2020



LAVERNEJORGE MORTON

External Reference #: [id number]

: 1935

Sex: Female



Demographics





                          Address                   [address] [place]  [address]

 

                          Home Phone                [phone]

 

                          Preferred Language        English

 

                          Marital Status            Unknown

 

                          Yarsanism Affiliation     Unknown

 

                          Race                      Unknown

 

                                        Additional Race(s)  

 

                          Ethnic Group              Unknown





Author





                          Author                    [facility]

t

 

                          Organization              [facility]

 

                          Address                   [address]

[place]  [address]



 

                          Phone                     Unavailable







Support





                Name            Relationship    Address         Phone

 

                    JANETH BASS    PRS                 [address], [place]  [id number] [phone]

 

                    JANETH BASS    PRS                 [address], [place]  [id number] [phone]







Care Team Providers





                    Care Team Member Name Role                Phone

 

                    ILANA BALLARD MD   PCP                 [phone]

 

                    MORENA COOK Attphys             Unavailable

 

                    SISI POST M.D. Attphys             Unavailable

 

                    OSEI GOMEZ M.D. Attphys             Unavailable

 

                    BERNY KENNEY        Attphys             Unavailable

 

                    BERNY KENNEY        Admphys             Unavailable







Payers





           Payer Name Policy Type Policy Number Effective Date Expiration Date S

ource

 

           [employer]            [id number]  2018 00:00:00            [facility]







Problems





           Condition Name Condition Details Condition Category Status     Onset 

Date Resolution

Date            Last Treatment Date Treating Clinician Comments        Source

 

        History of cardiac disorder History of cardiac disorder Problem Resolved

                          

                                                    [facility]

ns

 

       History of glaucoma History of glaucoma Problem Resolved                 

                   [facility]

 

       History of hypertension History of hypertension Problem Resolved         

                           

[facility]

 

       Hypersalivation Hypersalivation Problem Active                           

         [facility] 

[facility]

 

             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

blem      Active        

                                                                 [facility]

 

       Bilateral impacted cerumen Bilateral impacted cerumen Problem Active     

                               

[facility]

 

       Chronic otitis externa Chronic otitis externa Problem Active             

                       

[facility]

 

       Anemia Anemia Problem Active                                    [facility]







Allergies, Adverse Reactions, Alerts





        Allergy Name Allergy Type Status  Severity Reaction(s) Onset Date Inacti

ve Date 

Treating Clinician        Comments                  Source

 

       Codeine Allergy to Substance Active               2018 00:00:00    

                  [facility]

 

       codeine DA     Active MO            2012 00:00:00                  

    [facility]

 

       Codeine Derivatives drug allergy Active                                  

         [facility] 

[facility]

 

       iodine drug allergy Active                                           [facility]







Family History





           Family Member Diagnosis  Comments   Start Date Stop Date  Source

 

           Mother     Family history of cardiac disorder                        

          [facility]

 

           Mother     Family history of malignant neoplasm                      

            [facility]







Social History





                Smoking Status  Start Date      Stop Date       Source

 

                Never smoker                                    [facility]







Medications





             Ordered Medication Name Filled Medication Name Start Date   Stop Da

te    Current 

Medication? Ordering Clinician Indication Dosage     Frequency  Signature (SIG) 

Comments                  Components                Source

 

                    Fluocinolone Acetonide 0.01 % Otic Oil Fluocinolone Acetonid

e 0.01 % Otic Oil 

2019 00:00:00           Yes       SISI POST M.D.                       

        3 to 4 drops to both ears 

daily.                                                      [facility] 

[facility]

 

      Simvastatin 20 MG Oral Tablet Simvastatin 20 MG Oral Tablet             Ye

s                                       

                                        [facility]

 

     Plavix 75 MG Oral Tablet Plavix 75 MG Oral Tablet           Yes            

                         

[facility]

 

                          Pantoprazole Sodium 40 MG Oral Tablet Delayed Release 

Pantoprazole Sodium 40 MG 

Oral Tablet Delayed Release             Yes                                     

        [facility]

 

     Calcium 600 MG Oral Tablet Calcium 600 MG Oral Tablet           Yes        

                             

[facility]

 

        Vitamin B-12 100 MCG Oral Tablet Vitamin B-12 100 MCG Oral Tablet       

          Yes                             

                                                                [facility]

 

     Centrum Silver TABS Centrum Silver TABS           Yes                      

               [facility]

 

     NIFEdipine CR 30 MG TBCR NIFEdipine CR 30 MG TBCR           Yes            

                         

[facility]

 

                    Clopidogrel Bisulfate 75 MG Oral Tablet Clopidogrel Bisulfat

e 75 MG Oral Tablet 

              Yes                                                     [facility]

 

     Lumigan 0.03 % SOLN Lumigan 0.03 % SOLN           Yes                      

               [facility]

 

                Combigan 0.2-0.5 % Ophthalmic Solution Combigan 0.2-0.5 % Ophtha

lmic Solution                 

        Yes                                                             [facility]

 

     Iron TABS Iron TABS           Yes                                     [facility]

 

       Acetaminophen 325 Mg Tablet Acetaminophen 325 Mg Tablet               Yes

                  650           Every 

6 Hours as needed for Pain                                         [facility]

 

     Atenolol 50 Mg Tablet Atenolol 50 Mg Tablet           Yes            50    

    Daily           [facility]

 

                          Cholestyramine (With Sugar) (Questran Packet) 4 Gm Pac

ket Cholestyramine (With 

Sugar) (Questran Packet) 4 Gm Packet             Yes               4           E

very 6 Hours             [facility]

 

                          Clopidogrel Bisulfate (Plavix) 75 Mg Tablet Clopidogre

l Bisulfate (Plavix) 75 Mg

Tablet             Yes               75          Daily             [facility]

 

                          Cyanocobalamin (Vitamin B-12) 1,000 Mcg Tab Cyanocobal

amin (Vitamin B-12) 1,000 

Mcg Tab             Yes               1000        Daily             [facility]

 

                          Fe Fumarate/Fa/Mv, Min Comb#15 (Hemocyte Plus Capsule)

 1 Each Capsule Fe 

Fumarate/Fa/Mv, Min Comb#15 (Hemocyte Plus Capsule) 1 Each Capsule              

         Yes                               

                          Twice A Day                            [facility]

 

                          Nifedipine (Nifedipine Er) 30 Mg Tab.er.24 Nifedipine 

(Nifedipine Er) 30 Mg 

Tab.er.24             Yes                           Daily             [facility]

 

                          Ondansetron (Zofran Odt) 4 Mg Tab.rapdis Ondansetron (

Zofran Odt) 4 Mg 

Tab.rapdis             Yes               4           Every 6 Hours             [facility]

[facility]

 

                          Pantoprazole Sodium (Protonix) 40 Mg Tablet. Pantopr

azole Sodium (Protonix) 40

Mg Tablet.             Yes               40          Daily             [facility]

 

       Simvastatin 20 Mg Tablet Simvastatin 20 Mg Tablet               Yes      

            20            Today At 

9:00PM                                                      [facility]

 

                Aspirin 81 Mg Tab.chew, 81 Mg Oral Aspirin 81 Mg Tab.chew, 81 Mg

 Oral                 

2018 00:00:00 No                      81              Daily               

    [facility]







Vital Signs





             Vital Name   Observation Time Observation Value Comments     Source

 

             BP Systolic  2019 10:15:00 134 mm[Hg]                [facility]

 

             BP Diastolic 2019 10:15:00 60 mm[Hg]                 [facility]

 

             Heart Rate   2019 10:15:00 66 /min                   [facility]

 

             Weight       2019 10:29:00 96.4375 [lb_av]              [facility]

 

             Body Mass Index Calculated 2019 10:29:00 18.83 kg/m2         

      [facility]

 [facility]

 

             Height       2019 13:17:00 60 [in_us]                [facility]

 

             Weight       2019 13:17:00 95.25 [lb_av]              [facility]

 

             Body Mass Index Calculated 2019 13:17:00 18.6 kg/m2          

      [facility] 

[facility]

 

             Height       2019 10:29:00 60 [in_us]                [facility]

 

             Weight       2019 10:29:00 95.25 [lb_av]              [facility]

 

             Body Mass Index Calculated 2019 10:29:00 18.6 kg/m2          

      [facility] 

[facility]

 

             Height       2019 10:26:00 60 [in_us]                [facility]

 

             Weight       2019 10:26:00 95.25 [lb_av]              [facility]

 

             Body Mass Index Calculated 2019 10:26:00 18.6 kg/m2          

      [facility] 

[facility]







Procedures





                Procedure       Date / Time Performed Performing Clinician [facility]

e

 

                          Computed tomography of abdomen and pelvis without then

 with contrast 2018 

00:00:00                  ANNE MARIE [facility]

 

                Computed tomography of chest with contrast 2018 00:00:00 LUCERO BEYER [facility]

 

                Laparoscopic colectomy 2018 00:00:00 SHA RIVERA    [facility]

 

                EGD with biopsy 2018 00:00:00 [facility]

 

                Colonoscopy with biopsy 2018 00:00:00 [facility]

 

                    Computed tomography of abdomen and pelvis with contrast 2018 00:00:00 [facility]

 

                X-ray of chest, two views 2018 00:00:00 SWEET, LAIRD A  [facility]

[facility]

 

                History of Hysterectomy                                 [facility]

 

                History of Ear Surgery                                 [facility]

 

                History of Heart Surgery                                 [facility]

 

                History of Cholecystotomy                                 [facility]







Encounters





             Start Date/Time End Date/Time Encounter Type Admission Type Attendi

[facility] Facility   Care Department Encounter ID    Source

 

             2019 09:45:00 2019 09:45:00 Appointment; SISI POST M.D. BYRD, MICHAEL, M.D. [facility] [id number]        [facility] 

[facility]

 

             2019 10:15:00 2019 10:15:00 Appointment; SISI POST M.D. BYRD, MICHAEL, M.D. [facility]             Otorhinolaryngology [facility] 5527

[id number]        

[facility]

 

             2019 13:00:00 2019 13:00:00 Appointment; OSEI GOMEZ M.D. SIMMONS, JOHN, M.D. [facility]

[id number]        

[facility]

 

             2019 10:30:00 2019 10:30:00 Appointment; OSEI GOMEZ M.D. SIMMONS, JOHN, M.D. [facility]             Otorhinolaryngology [facility] 5269

[id number]        

[facility]

 

           2018 12:09:00 2018 14:05:00 Discharged Inpatient ER      

   BERNY KENNEY 

[facility]              [id number]        [facility]







Results





           Test Description Test Time  Test Comments Results    Result Comments 

Source

 

                CHEST SINGLE (PORTABLE) 2020 04:54:00                     

                              

                                                    [facility]                        [address] [place] [address]      Patient Name: JORGE BASS                                MR 
#: [id number]                  : 1935                                  
 Age/Sex: 84/F  Acct #: [id number]                              Req #: [id number]
[id number]  Adm Physician:                                                      
Ordered by: SISI COOK MD                         Report #: [id number]
        Location: ER                                      Room/Bed:             
      
________________________________________________________________________________

___________________    Procedure: [id number] DX/CHEST SINGLE (PORTABLE)  Exam 
Date:                             Exam Time:                                    
           REPORT STATUS: Signed    EXAMINATION:  CHEST SINGLE (PORTABLE)       
   INDICATION: CHEST PAIN      COMPARISON:  Chest CT dated 3/28/2018. Chest 
radiograph dated 3/22/2018.           FINDINGS:          Heart is not enlarged. 
Pulmonary vasculature is within normal limits. Cardiac   stents.      Stable 
appearance of the left base which correlates with epicardial fat-pad on   prior 
CT.      No consolidation. No pleural effusion. No pneumothorax.      
Cholecystectomy clips.      IMPRESSION:       No acute thoracic radiographic 
abnormality.         Signed by: Berny Mccormack MD on 2020 4:59 AM        
Dictated By: BERNY MCCORMACK MD  Electronically Signed By: BERNY MCCORMACK MD on 
20  Transcribed By: DOMINIC on 20       COPY TO:   
SISI COOK MD                                     

 

                    URINALYSIS COMPLETE 2019 13:08:00   

 

                                        Test Item

 

             UA COLOR (test code = COLU) YELLOW       YELLOW                    

 

 

             UA APPEARANCE (test code = APPU) HAZY         CLEAR        A       

      

 

             UA GLUCOSE DIPSTICK (test code = DGLUU) norm mg/dL   NEGATIVE      

             

 

             UA BILIRUBIN DIPSTICK (test code = BILU) NEGATIVE mg/dL NEGATIVE   

                

 

             UA KETONE DIPSTICK (test code = KETU) neg mg/dL    NEGATIVE        

           

 

             UA SPECIFIC GRAVITY (test code = SGU) 1.020        1.001-1.035     

           

 

             UA BLOOD DIPSTICK (test code = MILTON) 10 (Trace) Jimmy/uL NEGATIVE     

A             

 

             UA PH DIPSTICK (test code = KAMRON) 5.0          5.0-8.0              

      

 

             UA PROTEIN DIPSTICK (test code = PROU) 15 (TRACE) mg/dL Neg-15     

  A             

 

             UA UROBILINIOGEN DIPSTICK (test code = URO) norm mg/dL   0.0-0.2   

                 

 

             UA NITRITE DIPSTICK (test code = JROGE) NEGATIVE     NEGATIVE       

            

 

             UA LEUKOCYTE ESTERASE DIPSTICK (test code = LEUU) 500 Annmarie/uL (3+) u

L NEGATIVE     A            

 

 

             UA WBC (test code = WBCU) TNTC per HPF 0-5          A             

 

             UA RBC (test code = RBCU) 0-3 per HPF  0-5                        

 

             UA EPITHELIAL CELLS (test code = EPIU) RARE per HPF Few            

            

 

             UA BACTERIA (test code = BACU) FEW per HPF  NONE                   

    





Urine Source? Clean CatchURINALYSIS [id number] 12:35:00* 



             Test Item    Value        Reference Range Interpretation Comments

 

             UA COLOR (test code = COLU) YELLOW       YELLOW                    

 

 

             UA APPEARANCE (test code = APPU) HAZY         CLEAR        A       

      

 

             UA GLUCOSE DIPSTICK (test code = DGLUU) norm mg/dL   NEGATIVE      

             

 

             UA BILIRUBIN DIPSTICK (test code = BILU) NEGATIVE mg/dL NEGATIVE   

                

 

             UA KETONE DIPSTICK (test code = KETU) neg mg/dL    NEGATIVE        

           

 

             UA SPECIFIC GRAVITY (test code = SGU) 1.020        1.001-1.035     

           

 

             UA BLOOD DIPSTICK (test code = MILTON) 10 (Trace) Jimmy/uL NEGATIVE     

A             

 

             UA PH DIPSTICK (test code = KAMRON) 5.0          5.0-8.0              

      

 

             UA PROTEIN DIPSTICK (test code = PROU) 15 (TRACE) mg/dL Neg-15     

  A             

 

             UA UROBILINIOGEN DIPSTICK (test code = URO) norm mg/dL   0.0-0.2   

                 

 

             UA NITRITE DIPSTICK (test code = JORGE) NEGATIVE     NEGATIVE       

            

 

             UA LEUKOCYTE ESTERASE DIPSTICK (test code = LEUU) 500 Annmarie/uL (3+) u

L NEGATIVE     A            

 

 

             UA WBC (test code = WBCU)  per HPF     0-5                        

 

             UA RBC (test code = RBCU)  per HPF     0-5                        

 

             UA EPITHELIAL CELLS (test code = EPIU)  per HPF     Few            

            

 

             UA BACTERIA (test code = BACU)  per HPF     NONE                   

    





Urine Source? Clean CatchSCR MAMM BILATERAL CANDE CAD [id number] 14:45:33 
- SCR MAMM BILATERAL CANDE CAD DIGITALBILATERAL DIGITAL SCREENING MAMMOGRAM 3D/2D
 WITH CAD: 2019CLINICAL: Asymptomatic.  Digital breast tomosynthesis was 
performed in addition to routine CC and MLO views.  Current mammographic images 
were evaluated by either a [employer] M-Vu or a [employer] ImageChecker CAD (computer a
ided detection system).  Comparison is made to exams dated  2017 mammogram
, 2017 mammogram, 2017 ultrasound biopsy, 2017 ultrasound, 3/27/2
017 ultrasound, and 2017 mammogram - [facility]  There are
 scattered fibroglandular tissues in both breasts.  There is a biopsy clip in th
e right breast.  No suspicious mass, architectural distortion, malignant type ca
lcification, or lymph node abnormality detected.  Breast architecture is stable 
compared to prior exams.IMPRESSION: NEGATIVEThere is no mammographic evidence of
 malignancy. Resume annual screening mammography in one year.  Hi france M.D.          ss/penrad:2019 14:45:33      Entry: cl - 2019 14:45:
33Imaging Technologist: Yolanda Chang FW, [facility]-letter sent: B
IRADS 1-2 Normal  Mammogram BI-RADS: 1 NegativeSCR MAMM BILATERAL CANDE CAD 
[id number] 08:59:11 - SCR MAMM BILATERAL CANDE CAD DIGITALBILATERAL 
DIGITAL SCREENING MAMMOGRAM 3D/2D WITH CAD: 2018CLINICAL: Asymptomatic.  
Digital breast tomosynthesis was performed in addition to routine CC and MLO 
views.  Current mammographic images were evaluated by either a [employer] M-Vu or a 
[employer] ImageChecker CAD (computer aided detection system).  Comparison is made 
to exams dated  2017 mammogram, 2017 mammogram, and 3/27/2017 
mammogram - [facility]-.  There are scattered fibroglandular 
tissues in both breasts.  There is a biopsy clip in the right breast.  No 
suspicious mass, architectural distortion, malignant type calcification, or 
lymph node abnormality detected.  Breast architecture is stable compared to 
prior exams.IMPRESSION: BENIGNThere is no mammographic evidence of malignancy. 
Resume annual screening mammography in one year.  Je savage/penrad:2018 08:59:11  Imaging Technologist: Carole Marrufo FW, [facility]-FWletter sent: BIRADS 1-2 Normal  Mammogram BI-RADS: 2 
BenignSodium [id number] 07:13:00* 



             Test Item    Value        Reference Range Interpretation Comments

 

             Sodium Level (test code = 2951-2) 138          136-145             

       





[facility] [id number] 07:13:00* 



             Test Item    Value        Reference Range Interpretation Comments

 

             Potassium Level (test code = 2823-3) 4.0          3.5-5.1          

          





[facility] HospitalChloride [id number] 07:13:00* 



             Test Item    Value        Reference Range Interpretation Comments

 

             Chloride Level (test code = 2075-0) 106                      

         





[facility] HospitalCarbon Dioxide [id number] 07:13:00* 



             Test Item    Value        Reference Range Interpretation Comments

 

             Carbon Dioxide Level (test code = 2028-9) 25           22-29       

               





[facility] HospitalAnion [id number] 07:13:00* 



             Test Item    Value        Reference Range Interpretation Comments

 

             Anion Gap (test code = 33037-3) 11.0         8-16                  

     





[facility] HospitalBlood Urea [id number] 07:13:00* 



             Test Item    Value        Reference Range Interpretation Comments

 

             Blood Urea Nitrogen (test code = 3094-0) 10           7-26         

              





[facility] HospitalCreatinine2018-04-03 07:13:00* 



             Test Item    Value        Reference Range Interpretation Comments

 

             Creatinine (test code = 2160-0) 0.52         0.57-1.11    L        

     





[facility] HospitalBUN/Creatinine [id number] 07:13:00* 



             Test Item    Value        Reference Range Interpretation Comments

 

             BUN/Creatinine Ratio (test code = 3097-3) 19           6-25        

               





[facility] HospitalEstimat Glomerular Filtration Rate
2018 07:13:00* 



             Test Item    Value        Reference Range Interpretation Comments

 

             Estimat Glomerular Filtration Rate (test code = 17771-8) 60-       

   >60                        





Ranges were taken from the National Kidney Disease Education Program and the [facility] literature.Reference ranges:60 or greater: Hzipmh54-10 (
for 3 consecutive months): Chronic kidney disease 15 or less: Kidney failureSeymour HospitalGlucose Psafi0539-20-90 07:13:00* 



             Test Item    Value        Reference Range Interpretation Comments

 

             Glucose Level (test code = UMC8945) 95                       

         





[facility] HospitalCalcium Rhkhm5103-34-08 07:13:00* 



             Test Item    Value        Reference Range Interpretation Comments

 

             Calcium Level (test code = 73231-3) 8.4          8.4-10.2          

         





[facility] HospitalWhite Blood Oupea2137-87-65 06:53:00* 



             Test Item    Value        Reference Range Interpretation Comments

 

             White Blood Count (test code = 6690-2) 8.11         4.8-10.8       

            





[facility] HospitalRed Blood Vjvhc2473-67-60 06:53:00* 



             Test Item    Value        Reference Range Interpretation Comments

 

             Red Blood Count (test code = 789-8) 4.97         3.6-5.1           

         





[facility] HospitalHemoglobin2018-04-03 06:53:00* 



             Test Item    Value        Reference Range Interpretation Comments

 

             Hemoglobin (test code = 94391-1) 11.7         12.0-16.0    L       

      





[facility] HospitalHematocrit2018-04-03 06:53:00* 



             Test Item    Value        Reference Range Interpretation Comments

 

             Hematocrit (test code = 4544-3) 37.3         34.2-44.1             

     





[facility] HospitalMean Corpuscular Rvlmrw9660-07-88 
06:53:00* 



             Test Item    Value        Reference Range Interpretation Comments

 

             Mean Corpuscular Volume (test code = 787-2) 75.1         81-99     

   L             





[facility] HospitalMean Corpuscular Aflszunozk4209-26-77 
06:53:00* 



             Test Item    Value        Reference Range Interpretation Comments

 

             Mean Corpuscular Hemoglobin (test code = 785-6) 23.5         28-32 

       L             





[facility] HospitalMean Corpuscular Hemoglobin Concent
2018 06:53:00* 



             Test Item    Value        Reference Range Interpretation Comments

 

             Mean Corpuscular Hemoglobin Concent (test code = 786-4) 31.4       

  31-35                      





[facility] HospitalPlatelet Ukwwx7178-55-24 06:53:00* 



             Test Item    Value        Reference Range Interpretation Comments

 

             Platelet Count (test code = 777-3) 345          140-360            

        





[facility] HospitalNeutrophils (%) (Auto)2018 06:53:00
  * 



             Test Item    Value        Reference Range Interpretation Comments

 

             Neutrophils (%) (Auto) (test code = 25377-2) 74.0         38.7-80.0

                  





[facility] HospitalLymphocytes (%) (Auto)2018 06:53:00
  * 



             Test Item    Value        Reference Range Interpretation Comments

 

             Lymphocytes (%) (Auto) (test code = 736-9) 13.4         18.0-39.1  

  L             





[facility] HospitalMonocytes (%) (Auto)2018 06:53:00* 



             Test Item    Value        Reference Range Interpretation Comments

 

             Monocytes (%) (Auto) (test code = 5905-5) 7.8          4.4-11.3    

               





[facility] HospitalEosinophils (%) (Auto)2018 06:53:00
  * 



             Test Item    Value        Reference Range Interpretation Comments

 

             Eosinophils (%) (Auto) (test code = 713-8) 2.6          0.0-6.0    

                





[facility] HospitalBasophils (%) (Auto)2018 06:53:00* 



             Test Item    Value        Reference Range Interpretation Comments

 

             Basophils (%) (Auto) (test code = 706-2) 0.7          0.0-1.0      

              





[facility] HospitalIM GRANULOCYTES %2018 06:53:00* 



             Test Item    Value        Reference Range Interpretation Comments

 

             IM GRANULOCYTES % (test code = IM GRANULOCYTES %) 1.5          0.0-

1.0      H             





[facility] HospitalNeutrophils # (Auto)2018 06:53:00* 



             Test Item    Value        Reference Range Interpretation Comments

 

             Neutrophils # (Auto) (test code = 751-8) 6.0          2.1-6.9      

              





[facility] HospitalLymphocytes # (Auto)2018 06:53:00* 



             Test Item    Value        Reference Range Interpretation Comments

 

             Lymphocytes # (Auto) (test code = 79055-0) 1.1          1.0-3.2    

                





[facility] HospitalMonocytes # (Auto)2018 06:53:00* 



             Test Item    Value        Reference Range Interpretation Comments

 

             Monocytes # (Auto) (test code = 742-7) 0.6          0.2-0.8        

            





[facility] HospitalEosinophils # (Auto)2018 06:53:00* 



             Test Item    Value        Reference Range Interpretation Comments

 

             Eosinophils # (Auto) (test code = 711-2) 0.2          0.0-0.4      

              





[facility] HospitalBasophils # (Auto)2018 06:53:00* 



             Test Item    Value        Reference Range Interpretation Comments

 

             Basophils # (Auto) (test code = 704-7) 0.1          0.0-0.1        

            





[facility] HospitalAbsolute Immature Granulocyte (auto
2018 06:53:00* 



             Test Item    Value        Reference Range Interpretation Comments

 

                                        Absolute Immature Granulocyte (auto (inga

t code = Absolute Immature Granulocyte 

(auto)          0.12            0-0.1           H                





[facility] HospitalPlatelet Nxztzygw5381-28-50 14:06:00* 



             Test Item    Value        Reference Range Interpretation Comments

 

             Platelet Estimate (test code = 20099-8) ADEQUATE                   

             





[facility] HospitalPlatelet Morphology Fpvugps4454-02-74 
14:06:00* 



             Test Item    Value        Reference Range Interpretation Comments

 

             Platelet Morphology Comment (test code = 67612-8) NORMAL           

                       





[facility] HospitalPoikilocytosis2018-04-01 14:06:00* 



             Test Item    Value        Reference Range Interpretation Comments

 

             Poikilocytosis (test code = 779-9) MARKED                          

        





[facility] HospitalAnisocytosis2018-04-01 14:06:00* 



             Test Item    Value        Reference Range Interpretation Comments

 

             Anisocytosis (test code = 702-1) MARKED                            

      





[facility] HospitalMicrocytosis2018-04-01 14:06:00* 



             Test Item    Value        Reference Range Interpretation Comments

 

             Microcytosis (test code = 741-9) SLIG                              

      





[facility] HospitalMacrocytosis2018-04-01 14:06:00* 



             Test Item    Value        Reference Range Interpretation Comments

 

             Macrocytosis (test code = 738-5) MODERATE                          

      





[facility] HospitalRed Cell Morphology Prfccnb8907-55-02 
14:06:00* 



             Test Item    Value        Reference Range Interpretation Comments

 

             Red Cell Morphology Comment (test code = 6742-1) ABNORMAL          

                      





[facility] HospitalClostridium Difficile Toxin A & B
2018 15:00:00* 



             Test Item    Value        Reference Range Interpretation Comments

 

             Clostridium Difficile Toxin A & B (test code = 649230629) NEGATIVE 

    NEGATIVE                   





Testing on stool aspirate specimens is outside  claims since specime
n type not validated on this assay.[facility]
Differential Total Cells Qqahloc4917-60-80 10:33:00* 



             Test Item    Value        Reference Range Interpretation Comments

 

                          Differential Total Cells Counted (test code = Differen

tial Total Cells Counted) 

100                                                          





[facility] HospitalNeutrophils % (Manual)2018 10:33:00
  * 



             Test Item    Value        Reference Range Interpretation Comments

 

             Neutrophils % (Manual) (test code = 12645-0) 77           40-74    

    H             





[facility] HospitalBand Neutrophils %2018 10:33:00* 



             Test Item    Value        Reference Range Interpretation Comments

 

             Band Neutrophils % (test code = 764-1) 1                           

            





[facility] HospitalLymphocytes % (Manual)2018 10:33:00
  * 



             Test Item    Value        Reference Range Interpretation Comments

 

             Lymphocytes % (Manual) (test code = 737-7) 17           19-48      

  L             





[facility] HospitalMonocytes % (Manual)2018 10:33:00* 



             Test Item    Value        Reference Range Interpretation Comments

 

             Monocytes % (Manual) (test code = 744-3) 2            3.4-9.0      

L             





[facility] HospitalEosinophils % (Manual)2018 10:33:00
  * 



             Test Item    Value        Reference Range Interpretation Comments

 

             Eosinophils % (Manual) (test code = 714-6) 3            0-7        

                





[facility] HospitalHypochromasia2018-03-31 10:33:00* 



             Test Item    Value        Reference Range Interpretation Comments

 

             Hypochromasia (test code = 728-6) SLIGHT                           

       





[facility] HospitalRed Cell Distribution Gqosd5730-06-52 
06:23:00* 



             Test Item    Value        Reference Range Interpretation Comments

 

             Red Cell Distribution Width (test code = 53544-0) 29.1         11.7

-14.4    H             





[facility] HospitalBedside Niqbewb3416-21-98 08:30:00* 



             Test Item    Value        Reference Range Interpretation Comments

 

             Bedside Glucose (test code = 40790-8) 70                     

           





Meter ID: [id number] [facility]lineCarcinoembryonic 
Qtzqkby4391-61-80 06:07:00* 



             Test Item    Value        Reference Range Interpretation Comments

 

             Carcinoembryonic Antigen (test code = 2039-6) 7.5          0.0-4.7 

     H             





       Roche ECLIA methodology       Nonsmokers  <3.9                           
          Smokers     <5.6Performed at:   - [facility]  [id number] Director: Demian George MD, Phone:  [phone] HospitalTotal Ezteasryc3577-85-55 07:53:00* 



             Test Item    Value        Reference Range Interpretation Comments

 

             Total Bilirubin (test code = 1975-2) 1.1          0.2-1.2          

          





[facility] HospitalAspartate Amino Transf (AST/SGOT)
2018 07:53:00* 



             Test Item    Value        Reference Range Interpretation Comments

 

                                        Aspartate Amino Transf (AST/SGOT) (test 

code = Aspartate Amino Transf 

(AST/SGOT))     14              5-34                             





[facility] HospitalAlanine Aminotransferase (ALT/SGPT)
2018 07:53:00* 



             Test Item    Value        Reference Range Interpretation Comments

 

             Alanine Aminotransferase (ALT/SGPT) (test code = 1742-6) 8         

   0-55                       





[facility] Apslxnc6960-26-23 07:53:00* 



             Test Item    Value        Reference Range Interpretation Comments

 

             Total Protein (test code = 2885-2) 5.6          6.5-8.1      L     

        





[facility] HospitalAlbumin2018-03-26 07:53:00* 



             Test Item    Value        Reference Range Interpretation Comments

 

             Albumin (test code = 1751-7) 2.9          3.5-5.0      L           

  





[facility] HospitalGlobulin2018-03-26 07:53:00* 



             Test Item    Value        Reference Range Interpretation Comments

 

             Globulin (test code = 08791-3) 2.7          2.3-3.5                

    





[facility] HospitalAlbumin/Globulin Mpibk4160-71-91 07:53:00
  * 



             Test Item    Value        Reference Range Interpretation Comments

 

             Albumin/Globulin Ratio (test code = 1759-0) 1.1          0.8-2.0   

                 





[facility] HospitalAlkaline Tgrdrnvdjbm9335-14-41 07:53:00* 



             Test Item    Value        Reference Range Interpretation Comments

 

             Alkaline Phosphatase (test code = 6768-6) 62                 

               





[facility] HospitalProthrombin Kqtc4489-87-47 07:25:00* 



             Test Item    Value        Reference Range Interpretation Comments

 

             Prothrombin Time (test code = 5902-2) 13.7         11.9-14.5       

           





[facility] HospitalProthromb Time International Ratio
2018 07:25:00* 



             Test Item    Value        Reference Range Interpretation Comments

 

             Prothromb Time International Ratio (test code = 6301-6) 1.14       

                             





Oral Anticoagulant Therapy INR Values:1. Low Intensity Therapy        1.5 - 2.02
. Moderate Intensity Therapy   2.0 - 3.03. High Intensity Therapy(1)    2.5 - 3.
54. High Intensity Therapy(2)    3.0 - 4.05. Panic Value INR              > 5.0
[facility] HospitalActivated Partial Thromboplast Time
2018 07:25:00* 



             Test Item    Value        Reference Range Interpretation Comments

 

             Activated Partial Thromboplast Time (test code = 33583-3) 28.3     

    23.8-35.5                  





[facility] 



             Test Item    Value        Reference Range Interpretation Comments

 

             Stomatocytes (test code = 36985-2) SLIGHT                          

        





[facility] HospitalElliptocytes2018-03-24 10:22:00* 



             Test Item    Value        Reference Range Interpretation Comments

 

             Elliptocytes (test code = 22712-6) SLIGHT                          

        





[facility] Hospitaltool Occult Dyuix5107-77-17 18:51:00* 



             Test Item    Value        Reference Range Interpretation Comments

 

             Stool Occult Blood (test code = 2335-8) POSITIVE     NEGATIVE     H

             





[facility] HospitalVitamin B12 Fzede0555-74-58 13:02:00* 



             Test Item    Value        Reference Range Interpretation Comments

 

             Vitamin B12 Level (test code = 21591-3) 218          213-816       

             





[facility] HospitalFolate2018-03-23 13:02:00* 



             Test Item    Value        Reference Range Interpretation Comments

 

             Folate (test code = 2284-8) 16.7         7.0-15.4     H            

 





[facility] HospitalThyroid Stimulating Hormone (TSH)
2018 12:49:00* 



             Test Item    Value        Reference Range Interpretation Comments

 

             Thyroid Stimulating Hormone (TSH) (test code = 05252-8) 1.130      

  0.350-4.940                





[facility] HospitalIron Ujpjz0041-60-55 12:27:00* 



             Test Item    Value        Reference Range Interpretation Comments

 

             Iron Level (test code = 2498-4) 20                  L        

     





[facility] HospitalTotal Iron Binding Whmzmiez1839-62-13 
12:27:00* 



             Test Item    Value        Reference Range Interpretation Comments

 

             Total Iron Binding Capacity (test code = 2500-7) 508          261-4

78      H             





[facility] HospitalPercent Iron Xuzotfknen1887-85-25 
12:27:00* 



             Test Item    Value        Reference Range Interpretation Comments

 

             Percent Iron Saturation (test code = 2502-3) 4            15-50    

    L             





[facility] HospitalTransferrin2018-03-23 12:27:00* 



             Test Item    Value        Reference Range Interpretation Comments

 

             Transferrin (test code = 3034-6) 363          180-382              

      





[facility] HospitalUrine QRZ0234-28-19 21:10:00* 



             Test Item    Value        Reference Range Interpretation Comments

 

             Urine WBC (test code = 5821-4) 6-10         0-5          H         

    





[facility] HospitalUrine ZAP5333-34-13 21:10:00* 



             Test Item    Value        Reference Range Interpretation Comments

 

             Urine RBC (test code = 79972-0) NONE         0-5                   

     





[facility] HospitalUrine Lrajvdyo6113-95-48 21:10:00* 



             Test Item    Value        Reference Range Interpretation Comments

 

             Urine Bacteria (test code = 69945-5) MODERATE     NONE         H   

          





[facility] HospitalUrine Epithelial Ibpid0202-45-29 21:10:00
  * 



             Test Item    Value        Reference Range Interpretation Comments

 

             Urine Epithelial Cells (test code = 98506-5) MANY         NONE     

                  





[facility] HospitalUrine Hjxuv7043-34-67 20:56:00* 



             Test Item    Value        Reference Range Interpretation Comments

 

             Urine Color (test code = 5778-6) YELLOW       YELLOW               

      





[facility] HospitalUrine Saxxcey6007-06-03 20:56:00* 



             Test Item    Value        Reference Range Interpretation Comments

 

             Urine Clarity (test code = 19501-7) SL CLOUDY    CLEAR             

         





[facility] HospitalUrine Specific Hlnkwgu9038-26-92 20:56:00
  * 



             Test Item    Value        Reference Range Interpretation Comments

 

             Urine Specific Gravity (test code = 5811-5) 1.015        1.010-1.02

5                





[facility] HospitalUrine vF3674-99-74 20:56:00* 



             Test Item    Value        Reference Range Interpretation Comments

 

             Urine pH (test code = 73867-1) 7            5-7                    

    





[facility] HospitalUrine Leukocyte Hkycjjns4095-07-19 
20:56:00* 



             Test Item    Value        Reference Range Interpretation Comments

 

             Urine Leukocyte Esterase (test code = 5799-2) 2+           NEGATIVE

     H             





[facility] HospitalUrine Pnnowkb6086-49-81 20:56:00* 



             Test Item    Value        Reference Range Interpretation Comments

 

             Urine Nitrite (test code = 15078-7) NEGATIVE     NEGATIVE          

         





[facility] HospitalUrine Fksgira0724-40-68 20:56:00* 



             Test Item    Value        Reference Range Interpretation Comments

 

             Urine Protein (test code = 5804-0) NEGATIVE     NEGATIVE           

        





[facility] HospitalUrine Glucose (UA)2018 20:56:00* 



             Test Item    Value        Reference Range Interpretation Comments

 

             Urine Glucose (UA) (test code = 2349-9) NEGATIVE     NEGATIVE      

             





[facility] HospitalUrine Qsyqtyc7389-32-82 20:56:00* 



             Test Item    Value        Reference Range Interpretation Comments

 

             Urine Ketones (test code = 56343-9) NEGATIVE     NEGATIVE          

         





[facility] HospitalUrine Rdmdtasfwkkx3912-12-50 20:56:00* 



             Test Item    Value        Reference Range Interpretation Comments

 

             Urine Urobilinogen (test code = 86598-3) 1            0.2-1        

              





[facility] HospitalUrine Pufugdlci1724-38-81 20:56:00* 



             Test Item    Value        Reference Range Interpretation Comments

 

             Urine Bilirubin (test code = 1978-6) NEGATIVE     NEGATIVE         

          





[facility] HospitalUrine Pkxde1887-60-92 20:56:00* 



             Test Item    Value        Reference Range Interpretation Comments

 

             Urine Blood (test code = 86205-8) NEGATIVE     NEGATIVE            

       





[facility] HospitalMagnesium Ndqqp9431-69-85 20:31:00* 



             Test Item    Value        Reference Range Interpretation Comments

 

             Magnesium Level (test code = 70346-5) 2.0          1.3-2.1         

           





[facility] HospitalCreatine Kinase AL8890-04-34 20:30:00* 



             Test Item    Value        Reference Range Interpretation Comments

 

             Creatine Kinase MB (test code = 68643-8) 0.70         0-5.0        

              





[facility] HospitalTroponin -32-03 20:30:00* 



             Test Item    Value        Reference Range Interpretation Comments

 

             Troponin I (test code = HUR7362) 0.002        0-0.300              

      





[facility] HospitalCreatine Yzvvpm9759-07-60 20:23:00* 



             Test Item    Value        Reference Range Interpretation Comments

 

             Creatine Kinase (test code = 2157-6) 60                      

          





[facility] HospitalCT ABDOMEN/PELVIS WOW    [facility]   [address] [address]      Patient Name: JORGE BASS   MR #: [id number]    : 1935 
Age/Sex: 82/F  Acct #: [id number] Req #: [id number]  Adm Physician: BERNY KENNEY MD    Ordered by: BERNY KENNEY MD  Report #: [id number]   Location: MED/SURG2  
Room/Bed: [facility]    _____________________________________________________
______________________________________________    Procedure: [id number] CT/CT ABD
OMEN/PELVIS WOW  Exam Date: 18                            Exam Time: 1300 
      REPORT STATUS: Signed    PROCEDURE: CT ABDOMEN  T  PELVIS W/WO CONTRAST   
    TECHNIQUE:    The abdomen and pelvis were scanned utilizing a multidetector 
helical    scanner from the diaphragm to the lesser trochanter before and after 
   the IV administration of 100 cc of Isovue 370.  Coronal and sagittal    multi
planar reformations were obtained.       COMPARISON: None.       INDICATIONS:   
ANEMIA, ADRENAL AND LIVER LESION       FINDINGS:   LOWER THORAX: Small pleural e
ffusions and adjacent lower lobe    atelectasis.       HEPATOBILIARY: Left hepat
ic 1.1 cm cyst with tiny punctate    calcification. No suspicious focal hepatic 
lesions.  No biliary ductal    dilatation.   SPLEEN: No splenomegaly.   PANCREAS
: No focal masses or ductal dilatation.       ADRENALS: Right adrenal 1.3 cm and
 left adrenal 0.9 cm nodules have    densities on noncontrast CT which likely re
flect lipid rich adenomas.    The right nodule has a washout of 61% compatible w
ith an adenoma, and    the left nodule has a washout of 24% which is indetermina
te.     Evaluation is somewhat limited as there is hyperdensity in the right    
renal pelvis and renal cortices which may be related to test bolus    injection.
    KIDNEYS/URETERS: No hydronephrosis, stones, or solid mass lesions. A    righ
t superior pole 0.9 cm hypodensity is indeterminate.    PELVIC ORGANS/BLADDER: F
oley catheter in place. Otherwise,    unremarkable.       PERITONEUM / RETROPERI
TONEUM: Small amount of fluid in the left abdomen    may represent complex fluid
 such as hemorrhage from recent surgery.    Small amount of likely post-operativ
e free air. No evidence of active    contrast extravasation on arterial phase wi
thin the visualized portions    of this hyperdense fluid. This fluid increases i
n density between the    arterial phase (series 5 image 80) measuring 22 HU to 5
4 HU (series 8    image 90).    LYMPH NODES: No lymphadenopathy.   VESSELS: Repl
aced left hepatic artery arises from the left gastric    artery. Scattered ather
osclerotic calcifications.       GI TRACT: No distention or wall thickening. Pos
tsurgical changes    related to right hemicolectomy with ileal colonic anastomos
is. Multiple    dilated loops of small bowel without transition which gradually 
tapers    to distal ileum likely related to ileus. Enteric contrast is noted in 
   the colon.       BONES AND SOFT TISSUES: Midline laparotomy scar and multiple
 skin    staples. Mild anasarca.        IMPRESSION:   1. Status post right colon
ic resection and ileocolonic anastomosis.   2. Hyperdense fluid in the left abdo
men may represent hemorrhage from    recent surgery. No evidence of active arter
ial contrast extravasation    within the visualized portions of this hyperdense 
fluid. However,    increased density of this fluid between the arterial phase an
d the 15    minute delayed phase may reflect a slow active bleed/oozing or    cl
otting.  Consider serial hemoglobin and hematocrit and follow up CT    in the se
tting of worsening anemia.   3. Multiple dilated loops of small bowel which grad
ually tapers to the    distal ileum likely related to ileus.    4. Bilateral adr
enal nodules with density on the noncontrast phase    compatible with lipid rich
 adenomas. Adrenal washout of the right    adrenal nodule is concordant with an 
adenoma. Left adrenal washout    indicates the nodule is indeterminate.  Given s
uggestion of test    contrast bolus prior to acquisition of the non-contrast pha
se (contrast    present in the urinary collecting system), evaluation is limited
.    Recommend follow up CT or MRI adrenal mass protocol on an outpatient    bas
is.    5. Left hepatic cyst. No suspicious hepatic lesions.    6. Small pleural 
effusions, anasarca, and ascites. Small amount of    likely post-operative pneum
operitoneum.    1.        Findings discussed with Dr. Kenney on 3/30/2018 at 1640 
hrs.           Dictated by:  Clint Youssef M.D. on 3/30/2018 at 14:47        El
ectronically approved by:  Clint Youssef M.D. on 3/30/2018 at 14:47            
       Dictated By: CLINT YOUSSEF MD  Electronically Signed By: CLINT YOUSSEF MD
 on 18 1447  Transcribed By: DANDRE on 18 1447       COPY TO:   BERNY KENNEY MD        ABDOMEN-1VIEW (KUB)    [place] [address]      Patient Name: 
JORGE BASS   MR #: [id number]    : 1935 Age/Sex: 82/F  Acct #: 
[id number] Req #: [id number]  Adm Physician: BERNY KENNEY MD    Ordered by: CELSO WOO MD  Report #: [id number]   Location: MED/SURG2  Room/Bed: [facility]    
_____________________________________________________
______________________________________________    Procedure: [id number] DX/ABDOME
N-1VIEW (KUB)  Exam Date: 18                            Exam Time:    
    REPORT STATUS: Signed    EXAM: Abdomen 1  View   INDICATION:                
 COMPARISON: CT dated 3/25/2018      FINDINGS:   Nonobstructive bowel gas patter
n. Mild pneumoperitoneum, probably postsurgical,   evident by Rigler's sign in l
eft abdomen.   Surgical clips overlying mid abdomen. There is also suture line i
n right   abdomen.   Contrast excretion from prior CT in the bladder. Questionab
le retained contrast   within bowel in left abdomen.   Upper abdomen is excluded
 from the image.   No acute osseous abnormality.      IMPRESSION:   1.  Very sullivan
ited single view exam.   2.  Nonobstructive bowel gas pattern.   3.  Pneumoperit
oneum, which is probably postsurgical.         Signed by: Dr. Milton Vaughan MD 
on 3/29/2018 8:51 PM        Dictated By: MILTON VAUGHAN MD  Electronically Signed
 By: MILTON VAUGHAN MD on 18  Transcribed By: DOMINIC on 18
       COPY TO:   CELSO WOO MD        CT CHEST W    [place] [address]      Patient 
Name: JORGE BASS   MR #: [id number]    : 1935 Age/Sex: 82/F  Acct 
#: [id number] Req #: [id number]  Adm Physician: BERNY KENNEY MD    Ordered by: 
BERNY KENNEY MD  Report #: [id number]   Location: MED/SURG2  Room/Bed: [facility]    
_____________________________________________________
______________________________________________    Procedure: [id number] CT/CT LETICIA
ST W  Exam Date: 18                            Exam Time: 2156
T STATUS: Signed    EXAM: CT CHEST W   DATE: 3/28/2018 8:57 PM     INDICATION:  
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
  None   TECHNIQUE: Multidetector CT scanning of the chest was performed.  Coron
al and   sagittal multiplanar reformations were obtained.       IV Contrast: 100
 ml Isovue 370/300      FINDINGS:   LUNGS AND PLEURA: There are small bilateral 
pleural effusions with associated   lower lobe and scattered lingular and right 
middle lobe atelectasis. There is   an irregular 3.1 cm opacity in the posterior
 right upper lobe with adjacent   small nodules. Nonspecific 4 mm right upper an
d middle lobe nodules (series 3   image 16, 60)      HEART, MEDIASTINUM, VESSELS
: Bilateral thyroid nodules, largest 2.4 cm on the   left. Mild cardiomegaly wit
h coronary artery and aortic atherosclerotic   disease. No suspicious adenopathy
.      UPPER ABDOMEN: Pneumoperitoneum and mild to moderate visualized free flui
d,   incompletely assessed, status post recent surgery.  Cholecystectomy, right 
  renal cyst, and bilateral adrenal nodules again noted. Stable 9 mm left liver 
  cyst. A 9 mm segment 3 low-density liver lesion (image 102) is more conspicuou
s   than on prior, which was degraded by motion in this region.  Probable right 
  flash fill hemangioma (image 99).      MUSCULOSKELETAL: No suspicious findings
.      IMPRESSION:      1. Small bilateral effusions with scattered atelectasis.
   2. Irregular 3.1 cm right upper lobe opacity and adjacent tiny nodules, most 
  likely infectious. Attention on follow-up.   3. Two nonspecific 4 mm right upp
er and middle lobe nodules.   4. Left liver subcentimeter low density lesion mos
t likely a small metastasis.    This is distinct from the left liver cyst.      
Signed by: Dr Rosa Moses MD on 3/29/2018 12:46 AM        Dictated By: ROSA MOSES MD  Electronically Signed By: ROSA MOSES MD on 18  Tra
nscribed By: DOMINIC on 18       COPY TO:   BERNY KENNEY MD        CT 
ABDOMEN/PELVIS W    [place] [address]      Patient Name: JORGE BASS   MR #: 
[id number]    : 1935 Age/Sex: 82/F  Acct #: [id number] Req #: [id number]
[id number]  Adm Physician: BERYN KENNEY MD    Ordered by: CELSO WOO MD  Report #: 
[id number]   Location: MED/SURG  Room/Bed: 100    
______________________________________________________
_____________________________________________    Procedure: [id number] CT/CT ABDO
MEN/PELVIS W  Exam Date: 18                            Exam Time: 1332    
   REPORT STATUS: Signed       ******** ADDENDUM #1 ********      Addendum: Ques
tionable thickening of the sigmoid junction. Proctitis not   excluded. Direct vi
sualization could be obtained as indicated.      Signed by: Dr. Kareem Saab MD
 on 3/25/2018 2:23 PM   ******** ORIGINAL REPORT ********      EXAM: CT Abdomen 
and Pelvis WITH contrast        INDICATION: EGD same day. Abnormal findings. Rul
e out metastasis.        COMPARISON: None.      TECHNIQUE:  Abdomen and Pelvis w
as scanned utilizing a multidetector helical   scanner after administration of I
V contrast. Coronal and sagittal reformations   were obtained.               IV 
CONTRAST: 100 mL Isovue-370                        COMPLICATIONS: None      RADI
ATION DOSE:        Total DLP:155 mGy*cm        Estimated effective dose: (DLP x 
0.015 x size factor) mSv        CTDIvol has been reviewed. It is below the limit
s set by the Radiation   Protocol Committee (RPC).      FINDINGS:        Abdomen
:          Lung Bases: Atelectasis present lung bases.      Solid Organs: 9 mm c
yst anterior left hepatic lobe. Focal fat attenuation along   the falciform liga
ment. Cholecystectomy clips are present. There is a 15 mm   nodule in the left a
drenal gland with indeterminate Hounsfield units of 71. 20   mm right adrenal le
roshni present with indeterminate Hounsfield units of 81.   Hypodensity right kidn
ey statistically cyst, but too small to definitively   characterize. Pancreas un
remarkable.      Upper GI Tract: Gastric decompression limits evaluation. No sma
ll bowel   obstructive changes.      Vascularity: Moderate aortoiliac vascular c
alcifications with no aneurysm.      Lymph Nodes: No suspicious mesenteric or ao
rtocaval adenopathy.      Other: None.         Pelvis:          Bladder: Unremar
kable.      Other: Uterus absent.      Colon: Areas of decompression limits eval
uation. Questionable wall thickening   in the rectal junction.      Bones: No ac
[place] findings.         IMPRESSION:    1.  Bilateral indeterminate adrenal lesions
. Adrenal mass protocol CT or MRI   recommended.      2.  If concern is present 
for metastatic disease in the chest, dedicated CT   chest to be recommended.    
  3.  Colonic evaluation limited by decompression.      Signed by: Dr. Kareem johansen MD on 3/25/2018 1:58 PM        Dictated By: KAREEM SAAB MD  Electronical
ly Signed By: KAREEM SAAB MD on 18 1423  Transcribed By: DOMINIC on  1358       COPY TO:   CELSO WOO MD        CHEST 2 VIEWS    [place] [address]      Patient Name: JORGE BASS   MR #: [id number]    : 1935 
Age/Sex: 82/F  Acct #: [id number] Req #: [id number]  Adm Physician:     
Ordered by: DAISY CORTEZ MD  Report #: [id number]   Location: ER  Room/Bed:    
 ___________________________________________________________________________
________________________    Procedure: [id number] DX/CHEST 2 VIEWS  Exam Date:   
                          Exam Time:        REPORT STATUS: Signed    EXAM: CHEST
 2 VIEWS, PA and lateral   INDICATION: Anemia, constipation   COMPARISON: None  
    FINDINGS:   LINES/TUBES: None      LUNGS: No consolidations or edema. Emphys
ematous changes.      PLEURA: No effusions or pneumothorax.      HEART AND MEDIA
STINUM: Normal size and contour.      BONES AND SOFT TISSUES: No acute findings.
 Surgical clips right upper quadrant   of the abdomen.      IMPRESSION:   No acu
te thoracic abnormality.            Signed by: Dr. Bismark Bass M.D. on 3/
 9:57 PM        Dictated By: BISMARK BASS MD  Electronically Signed By: 
BISMARK BASS MD on 18  Transcribed By: DOMINIC on 18     
  COPY TO:   DAISY CORTEZ MD        ABDOMEN COMP INCL UPR or DECUB    [place] [id number]      Patient Name: JORGE BASS   MR #: [id number]    : 1935 
Age/Sex: 82/F  Acct #: [id number] Req #: [id number]  Adm Physician:     
Ordered by: DAISY CORTEZ MD  Report #: [id number]   Location: ER  Room/Bed:    
 ___________________________________________________________________________
________________________    Procedure: [id number] DX/ABDOMEN COMP INCL UPR or DEC
UB  Exam Date:                             Exam Time:        REPORT STATUS: Sign
ed    EXAM:  ABDOMEN COMP INCL UPR or DECUB, supine and erect   INDICATION: Anem
ia, constipation   COMPARISON: None      FINDINGS:   LINES/TUBES: None      FABIAN
L PATTERN: No evidence for obstruction.      SOFT TISSUES:     Surgical clips ri
ght upper quadrant of the abdomen. Surgical   clips project over the pelvis. Phl
eboliths in the pelvis.      LUNG BASES: Not included      BONES: No acute findi
ngs.      IMPRESSION:   Moderate to large amount of retained stool. No evidence 
of obstruction.                  Signed by: Dr. Bismark Bass M.D. on 3/22/
2018 9:58 PM        Dictated By: BISMARK BASS MD  Electronically Signed By: BISMARK BASS MD on 18  Transcribed By: DOMINIC on 18       C
OPY TO:   DAISY CORTEZ MD

## 2020-09-18 NOTE — NUR
Signed new consent for stress test as the one she signed last night was for tredmill only, 
and she is having a adenosine.   Pt is in agreement that she would not be able to do a 
tredmill  stress. 



Left with nurse from stress test to go to [facility] via w/c

## 2020-09-18 NOTE — XMS REPORT
Continuity of Care Document

                             Created on: 2020



LAVERNEJORGE MORTON

External Reference #: [id number]

: 1935

Sex: Female



Demographics





                          Address                   [address] [place]  [address]

 

                          Home Phone                [phone]

 

                          Preferred Language        English

 

                          Marital Status            Unknown

 

                          Restoration Affiliation     Unknown

 

                          Race                      Unknown

 

                                        Additional Race(s)  

 

                          Ethnic Group              Unknown





Author





                          Author                    [facility]

t

 

                          Organization              [facility]

 

                          Address                   [address]

[place]  [address]



 

                          Phone                     Unavailable







Support





                Name            Relationship    Address         Phone

 

                    JANETH BASS    PRS                 [address] [place], [place]  [id number] [phone]

 

                    JANETH BASS    PRS                 [address] [place], [place]  [phone]







Care Team Providers





                    Care Team Member Name Role                Phone

 

                    ILANA BALLARD MD   PCP                 [phone]

 

                    MORENA COOK Attphys             Unavailable

 

                    SISI POST M.D. Attphys             Unavailable

 

                    OSEI GOMEZ M.D. Attphys             Unavailable

 

                    BERNY KENNEY        Attphys             Unavailable

 

                    BERNY KENNEY        Admphys             Unavailable







Payers





           Payer Name Policy Type Policy Number Effective Date Expiration Date S

ource

 

           [employer]            [id number]  2018 00:00:00            [facility]







Problems





           Condition Name Condition Details Condition Category Status     Onset 

Date Resolution

Date            Last Treatment Date Treating Clinician Comments        Source

 

        History of cardiac disorder History of cardiac disorder Problem Resolved

                          

                                                    [facility]

ns

 

       History of glaucoma History of glaucoma Problem Resolved                 

                   [facility]

 

       History of hypertension History of hypertension Problem Resolved         

                           

[facility]

 

       Hypersalivation Hypersalivation Problem Active                           

         [facility] 

[facility]

 

             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

blem      Active        

                                                                 [facility]

[facility]

 

       Bilateral impacted cerumen Bilateral impacted cerumen Problem Active     

                               

[facility]

 

       Chronic otitis externa Chronic otitis externa Problem Active             

                       

[facility]

 

       Anemia Anemia Problem Active                                    [facility]







Allergies, Adverse Reactions, Alerts





        Allergy Name Allergy Type Status  Severity Reaction(s) Onset Date Inacti

ve Date 

Treating Clinician        Comments                  Source

 

       Codeine Allergy to Substance Active               2018 00:00:00    

                  [facility]

 

       codeine DA     Active MO            2012 00:00:00                  

    [facility]

 

       Codeine Derivatives drug allergy Active                                  

         [facility] 

[facility]

 

       iodine drug allergy Active                                           [facility]







Family History





           Family Member Diagnosis  Comments   Start Date Stop Date  Source

 

           Mother     Family history of cardiac disorder                        

          [facility]

 

           Mother     Family history of malignant neoplasm                      

            [facility]







Social History





                Smoking Status  Start Date      Stop Date       Source

 

                Never smoker                                    [facility]







Medications





             Ordered Medication Name Filled Medication Name Start Date   Stop Da

te    Current 

Medication? Ordering Clinician Indication Dosage     Frequency  Signature (SIG) 

Comments                  Components                Source

 

                    Fluocinolone Acetonide 0.01 % Otic Oil Fluocinolone Acetonid

e 0.01 % Otic Oil 

2019 00:00:00           Yes       SISI POST M.D.                       

        3 to 4 drops to both ears 

daily.                                                      [facility] 

[facility]

 

      Simvastatin 20 MG Oral Tablet Simvastatin 20 MG Oral Tablet             Ye

s                                       

                                        [facility]

 

     Plavix 75 MG Oral Tablet Plavix 75 MG Oral Tablet           Yes            

                         

[facility]

 

                          Pantoprazole Sodium 40 MG Oral Tablet Delayed Release 

Pantoprazole Sodium 40 MG 

Oral Tablet Delayed Release             Yes                                     

        [facility]

 

     Calcium 600 MG Oral Tablet Calcium 600 MG Oral Tablet           Yes        

                             

[facility]

 

        Vitamin B-12 100 MCG Oral Tablet Vitamin B-12 100 MCG Oral Tablet       

          Yes                             

                                                                [facility]

 

     Centrum Silver TABS Centrum Silver TABS           Yes                      

               [facility]

 

     NIFEdipine CR 30 MG TBCR NIFEdipine CR 30 MG TBCR           Yes            

                         

[facility]

 

                    Clopidogrel Bisulfate 75 MG Oral Tablet Clopidogrel Bisulfat

e 75 MG Oral Tablet 

              Yes                                                     [facility]

 

     Lumigan 0.03 % SOLN Lumigan 0.03 % SOLN           Yes                      

               [facility]

 

                Combigan 0.2-0.5 % Ophthalmic Solution Combigan 0.2-0.5 % Ophtha

lmic Solution                 

        Yes                                                             [facility]

 

     Iron TABS Iron TABS           Yes                                     [facility]

 

       Acetaminophen 325 Mg Tablet Acetaminophen 325 Mg Tablet               Yes

                  650           Every 

6 Hours as needed for Pain                                         [facility]

 

     Atenolol 50 Mg Tablet Atenolol 50 Mg Tablet           Yes            50    

    Daily           [facility]

 

                          Cholestyramine (With Sugar) (Questran Packet) 4 Gm Pac

ket Cholestyramine (With 

Sugar) (Questran Packet) 4 Gm Packet             Yes               4           E

very 6 Hours             [facility]

 

                          Clopidogrel Bisulfate (Plavix) 75 Mg Tablet Clopidogre

l Bisulfate (Plavix) 75 Mg

Tablet             Yes               75          Daily             [facility]

 

                          Cyanocobalamin (Vitamin B-12) 1,000 Mcg Tab Cyanocobal

amin (Vitamin B-12) 1,000 

Mcg Tab             Yes               1000        Daily             [facility]

 

                          Fe Fumarate/Fa/Mv, Min Comb#15 (Hemocyte Plus Capsule)

 1 Each Capsule Fe 

Fumarate/Fa/Mv, Min Comb#15 (Hemocyte Plus Capsule) 1 Each Capsule              

         Yes                               

                          Twice A Day                            [facility]

 

                          Nifedipine (Nifedipine Er) 30 Mg Tab.er.24 Nifedipine 

(Nifedipine Er) 30 Mg 

Tab.er.24             Yes                           Daily             [facility]

 

                          Ondansetron (Zofran Odt) 4 Mg Tab.rapdis Ondansetron (

Zofran Odt) 4 Mg 

Tab.rapdis             Yes               4           Every 6 Hours             C

[facility]

 

                          Pantoprazole Sodium (Protonix) 40 Mg Tablet. Pantopr

azole Sodium (Protonix) 40

Mg Tablet.             Yes               40          Daily             [facility]

 

       Simvastatin 20 Mg Tablet Simvastatin 20 Mg Tablet               Yes      

            20            Today At 

9:00PM                                                      [facility]

 

                Aspirin 81 Mg Tab.chew, 81 Mg Oral Aspirin 81 Mg Tab.chew, 81 Mg

 Oral                 

2018 00:00:00 No                      81              Daily               

    [facility]







Vital Signs





             Vital Name   Observation Time Observation Value Comments     Source

 

             BP Systolic  2019 10:15:00 134 mm[Hg]                [facility]

 

             BP Diastolic 2019 10:15:00 60 mm[Hg]                 [facility]

 

             Heart Rate   2019 10:15:00 66 /min                   [facility]

 

             Weight       2019 10:29:00 96.4375 [lb_av]              [facility]

 

             Body Mass Index Calculated 2019 10:29:00 18.83 kg/m2         

      [facility]

 [facility]

 

             Height       2019 13:17:00 60 [in_us]                [facility]

 

             Weight       2019 13:17:00 95.25 [lb_av]              [facility]

 

             Body Mass Index Calculated 2019 13:17:00 18.6 kg/m2          

      [facility] 

[facility]

 

             Height       2019 10:29:00 60 [in_us]                [facility]

 

             Weight       2019 10:29:00 95.25 [lb_av]              [facility]

 

             Body Mass Index Calculated 2019 10:29:00 18.6 kg/m2          

      [facility] 

[facility]

 

             Height       2019 10:26:00 60 [in_us]                [facility]

 

             Weight       2019 10:26:00 95.25 [lb_av]              [facility]

 

             Body Mass Index Calculated 2019 10:26:00 18.6 kg/m2          

      [facility] 

[facility]







Procedures





                Procedure       Date / Time Performed Performing Clinician [facility]

e

 

                          Computed tomography of abdomen and pelvis without then

 with contrast 2018 

00:00:00                  ANNE MARIE [facility]

 

                Computed tomography of chest with contrast 2018 00:00:00 LUCERO BEYER [facility]

 

                Laparoscopic colectomy 2018 00:00:00 SHA RIVERA    [facility]

 

                EGD with biopsy 2018 00:00:00 [facility]

 

                Colonoscopy with biopsy 2018 00:00:00 [facility]

 

                    Computed tomography of abdomen and pelvis with contrast 2018 00:00:00 [facility]

 

                X-ray of chest, two views 2018 00:00:00 SWEET, LAIRD A  [facility]

[facility]

 

                History of Hysterectomy                                 [facility]

 

                History of Ear Surgery                                 [facility]

 

                History of Heart Surgery                                 [facility]

 

                History of Cholecystotomy                                 [facility]







Encounters





             Start Date/Time End Date/Time Encounter Type Admission Type Attendi

[facility] Facility   Care Department Encounter ID    Source

 

             2019 09:45:00 2019 09:45:00 Appointment; SISI POST M.D. BYRD, MICHAEL, M.D. [facility] [id number]        [facility] 

[facility]

 

             2019 10:15:00 2019 10:15:00 Appointment; SISI POST M.D. BYRD, MICHAEL, M.D. [facility]             Otorhinolaryngology [facility] 5527

[id number]        

[facility]

 

             2019 13:00:00 2019 13:00:00 Appointment; OSEI GOMEZ M.D. SIMMONS, JOHN, M.D. [facility]             [facility]

[phone]        

[facility]

 

             2019 10:30:00 2019 10:30:00 Appointment; OSEI GOMEZ M.D. SIMMONS, JOHN, M.D. [facility]             Otorhinolaryngology [facility] 5269

[id number]        

[facility]

 

           2018 12:09:00 2018 14:05:00 Discharged Inpatient ER      

   BERNY KENNEY 

[facility]              [id number]        [facility]







Results





           Test Description Test Time  Test Comments Results    Result Comments 

Source

 

                CHEST SINGLE (PORTABLE) 2020 04:54:00                     

                              

                                                    [facility]                        [address] [place] [address]      Patient Name: JORGE BASS                                MR 
#: [id number]                  : 1935                                  
 Age/Sex: 84/F  Acct #: [id number]                              Req #: [id number]
[id number]  Adm Physician:                                                      
Ordered by: SISI COOK MD                         Report #: [id number]
        Location: ER                                      Room/Bed:             
      
________________________________________________________________________________

___________________    Procedure: [id number] DX/CHEST SINGLE (PORTABLE)  Exam 
Date:                             Exam Time:                                    
           REPORT STATUS: Signed    EXAMINATION:  CHEST SINGLE (PORTABLE)       
   INDICATION: CHEST PAIN      COMPARISON:  Chest CT dated 3/28/2018. Chest 
radiograph dated 3/22/2018.           FINDINGS:          Heart is not enlarged. 
Pulmonary vasculature is within normal limits. Cardiac   stents.      Stable 
appearance of the left base which correlates with epicardial fat-pad on   prior 
CT.      No consolidation. No pleural effusion. No pneumothorax.      
Cholecystectomy clips.      IMPRESSION:       No acute thoracic radiographic 
abnormality.         Signed by: Berny Mccormack MD on 2020 4:59 AM        
Dictated By: BERNY MCCORMACK MD  Electronically Signed By: BERNY MCCORMACK MD on 
20  Transcribed By: DOMINIC on 20       COPY TO:   
SISI COOK MD                                     

 

                    URINALYSIS COMPLETE 2019 13:08:00   

 

                                        Test Item

 

             UA COLOR (test code = COLU) YELLOW       YELLOW                    

 

 

             UA APPEARANCE (test code = APPU) HAZY         CLEAR        A       

      

 

             UA GLUCOSE DIPSTICK (test code = DGLUU) norm mg/dL   NEGATIVE      

             

 

             UA BILIRUBIN DIPSTICK (test code = BILU) NEGATIVE mg/dL NEGATIVE   

                

 

             UA KETONE DIPSTICK (test code = KETU) neg mg/dL    NEGATIVE        

           

 

             UA SPECIFIC GRAVITY (test code = SGU) 1.020        1.001-1.035     

           

 

             UA BLOOD DIPSTICK (test code = MILTON) 10 (Trace) Jimmy/uL NEGATIVE     

A             

 

             UA PH DIPSTICK (test code = KAMRON) 5.0          5.0-8.0              

      

 

             UA PROTEIN DIPSTICK (test code = PROU) 15 (TRACE) mg/dL Neg-15     

  A             

 

             UA UROBILINIOGEN DIPSTICK (test code = URO) norm mg/dL   0.0-0.2   

                 

 

             UA NITRITE DIPSTICK (test code = JORGE) NEGATIVE     NEGATIVE       

            

 

             UA LEUKOCYTE ESTERASE DIPSTICK (test code = LEUU) 500 Annmarie/uL (3+) u

L NEGATIVE     A            

 

 

             UA WBC (test code = WBCU) TNTC per HPF 0-5          A             

 

             UA RBC (test code = RBCU) 0-3 per HPF  0-5                        

 

             UA EPITHELIAL CELLS (test code = EPIU) RARE per HPF Few            

            

 

             UA BACTERIA (test code = BACU) FEW per HPF  NONE                   

    





Urine Source? Clean CatchURINALYSIS [id number] 12:35:00* 



             Test Item    Value        Reference Range Interpretation Comments

 

             UA COLOR (test code = COLU) YELLOW       YELLOW                    

 

 

             UA APPEARANCE (test code = APPU) HAZY         CLEAR        A       

      

 

             UA GLUCOSE DIPSTICK (test code = DGLUU) norm mg/dL   NEGATIVE      

             

 

             UA BILIRUBIN DIPSTICK (test code = BILU) NEGATIVE mg/dL NEGATIVE   

                

 

             UA KETONE DIPSTICK (test code = KETU) neg mg/dL    NEGATIVE        

           

 

             UA SPECIFIC GRAVITY (test code = SGU) 1.020        1.001-1.035     

           

 

             UA BLOOD DIPSTICK (test code = MILTON) 10 (Trace) Jimmy/uL NEGATIVE     

A             

 

             UA PH DIPSTICK (test code = KAMRON) 5.0          5.0-8.0              

      

 

             UA PROTEIN DIPSTICK (test code = PROU) 15 (TRACE) mg/dL Neg-15     

  A             

 

             UA UROBILINIOGEN DIPSTICK (test code = URO) norm mg/dL   0.0-0.2   

                 

 

             UA NITRITE DIPSTICK (test code = JORGE) NEGATIVE     NEGATIVE       

            

 

             UA LEUKOCYTE ESTERASE DIPSTICK (test code = LEUU) 500 Annmarie/uL (3+) u

L NEGATIVE     A            

 

 

             UA WBC (test code = WBCU)  per HPF     0-5                        

 

             UA RBC (test code = RBCU)  per HPF     0-5                        

 

             UA EPITHELIAL CELLS (test code = EPIU)  per HPF     Few            

            

 

             UA BACTERIA (test code = BACU)  per HPF     NONE                   

    





Urine Source? Clean CatchSCR MAMM BILATERAL CANDE CAD [id number] 14:45:33 
- SCR MAMM BILATERAL CANDE CAD DIGITALBILATERAL DIGITAL SCREENING MAMMOGRAM 3D/2D
 WITH CAD: 2019CLINICAL: Asymptomatic.  Digital breast tomosynthesis was 
performed in addition to routine CC and MLO views.  Current mammographic images 
were evaluated by either a ViewsIQ M-Vu or a [employer] ImageChecker CAD (computer a
ided detection system).  Comparison is made to exams dated  2017 mammogram
, 2017 mammogram, 2017 ultrasound biopsy, 2017 ultrasound, 3/27/2
017 ultrasound, and 2017 mammogram - [facility]  There are
 scattered fibroglandular tissues in both breasts.  There is a biopsy clip in th
e right breast.  No suspicious mass, architectural distortion, malignant type ca
lcification, or lymph node abnormality detected.  Breast architecture is stable 
compared to prior exams.IMPRESSION: NEGATIVEThere is no mammographic evidence of
 malignancy. Resume annual screening mammography in one year.  Hi france M.D.          ss/penrad:2019 14:45:33      Entry: cl - 2019 14:45:
33Imaging Technologist: Yolanda Chang FW, [facility]-letter sent: B
IRADS 1-2 Normal  Mammogram BI-RADS: 1 NegativeSCR MAMM BILATERAL CANDE CAD 
[id number] 08:59:11 - SCR MAMM BILATERAL CANDE CAD DIGITALBILATERAL 
DIGITAL SCREENING MAMMOGRAM 3D/2D WITH CAD: 2018CLINICAL: Asymptomatic.  
Digital breast tomosynthesis was performed in addition to routine CC and MLO 
views.  Current mammographic images were evaluated by either a ViewsIQ M-Vu or a 
[employer] ImageChecker CAD (computer aided detection system).  Comparison is made 
to exams dated  2017 mammogram, 2017 mammogram, and 3/27/2017 
mammogram - [facility]-.  There are scattered fibroglandular 
tissues in both breasts.  There is a biopsy clip in the right breast.  No 
suspicious mass, architectural distortion, malignant type calcification, or 
lymph node abnormality detected.  Breast architecture is stable compared to 
prior exams.IMPRESSION: BENIGNThere is no mammographic evidence of malignancy. 
Resume annual screening mammography in one year.  Je savage/penrad:2018 08:59:11  Imaging Technologist: Carole Marrufo FW, [facility]-FWletter sent: BIRADS 1-2 Normal  Mammogram BI-RADS: 2 
BenignSodium [id number] 07:13:00* 



             Test Item    Value        Reference Range Interpretation Comments

 

             Sodium Level (test code = 2951-2) 138          136-145             

       





[facility] [id number] 07:13:00* 



             Test Item    Value        Reference Range Interpretation Comments

 

             Potassium Level (test code = 2823-3) 4.0          3.5-5.1          

          





[facility] HospitalChloride [id number] 07:13:00* 



             Test Item    Value        Reference Range Interpretation Comments

 

             Chloride Level (test code = 2075-0) 106                      

         





[facility] HospitalCarbon Dioxide [id number] 07:13:00* 



             Test Item    Value        Reference Range Interpretation Comments

 

             Carbon Dioxide Level (test code = 2028-9) 25           22-29       

               





[facility] HospitalAnion [id number] 07:13:00* 



             Test Item    Value        Reference Range Interpretation Comments

 

             Anion Gap (test code = 33037-3) 11.0         8-16                  

     





[facility] Hospital[facility] Urea [id number] 07:13:00* 



             Test Item    Value        Reference Range Interpretation Comments

 

             Blood Urea Nitrogen (test code = 3094-0) 10           7-26         

              





[facility] Hospital[facility]inine2018-04-03 07:13:00* 



             Test Item    Value        Reference Range Interpretation Comments

 

             Creatinine (test code = 2160-0) 0.52         0.57-1.11    L        

     





[facility] HospitalBUN/Creatinine [id number] 07:13:00* 



             Test Item    Value        Reference Range Interpretation Comments

 

             BUN/Creatinine Ratio (test code = 3097-3) 19           6-25        

               





[facility] HospitalEstimat Glomerular Filtration Rate
2018 07:13:00* 



             Test Item    Value        Reference Range Interpretation Comments

 

             Estimat Glomerular Filtration Rate (test code = 22501-3) 60-       

   >60                        





Ranges were taken from the National Kidney Disease Education Program and the [facility] [employer] literature.Reference ranges:60 or greater: Nosgcd68-34 (
for 3 consecutive months): Chronic kidney disease 15 or less: Kidney failureFormerly [facility] HospitalGlucose Cnsnc6137-58-22 07:13:00* 



             Test Item    Value        Reference Range Interpretation Comments

 

             Glucose Level (test code = ZFA9169) 95                       

         





[facility] HospitalCalcium Slxkb4020-91-62 07:13:00* 



             Test Item    Value        Reference Range Interpretation Comments

 

             Calcium Level (test code = 34983-1) 8.4          8.4-10.2          

         





[facility] HospitalWhite Blood Rujzz7534-82-41 06:53:00* 



             Test Item    Value        Reference Range Interpretation Comments

 

             White Blood Count (test code = 6690-2) 8.11         4.8-10.8       

            





[facility] HospitalRed Blood Jlftu2248-12-86 06:53:00* 



             Test Item    Value        Reference Range Interpretation Comments

 

             Red Blood Count (test code = 789-8) 4.97         3.6-5.1           

         





[facility] HospitalHemoglobin2018-04-03 06:53:00* 



             Test Item    Value        Reference Range Interpretation Comments

 

             Hemoglobin (test code = 68200-8) 11.7         12.0-16.0    L       

      





[facility] HospitalHematocrit2018-04-03 06:53:00* 



             Test Item    Value        Reference Range Interpretation Comments

 

             Hematocrit (test code = 4544-3) 37.3         34.2-44.1             

     





[facility] HospitalMean Corpuscular Jwxayz9228-86-86 
06:53:00* 



             Test Item    Value        Reference Range Interpretation Comments

 

             Mean Corpuscular Volume (test code = 787-2) 75.1         81-99     

   L             





[facility] HospitalMean Corpuscular Drhhdmgvan8779-75-63 
06:53:00* 



             Test Item    Value        Reference Range Interpretation Comments

 

             Mean Corpuscular Hemoglobin (test code = 785-6) 23.5         28-32 

       L             





[facility] HospitalMean Corpuscular Hemoglobin Concent
2018 06:53:00* 



             Test Item    Value        Reference Range Interpretation Comments

 

             Mean Corpuscular Hemoglobin Concent (test code = 786-4) 31.4       

  31-35                      





[facility] HospitalPlatelet Xzmbr1153-41-68 06:53:00* 



             Test Item    Value        Reference Range Interpretation Comments

 

             Platelet Count (test code = 777-3) 345          140-360            

        





[facility] HospitalNeutrophils (%) (Auto)2018 06:53:00
  * 



             Test Item    Value        Reference Range Interpretation Comments

 

             Neutrophils (%) (Auto) (test code = 94939-1) 74.0         38.7-80.0

                  





[facility] HospitalLymphocytes (%) (Auto)2018 06:53:00
  * 



             Test Item    Value        Reference Range Interpretation Comments

 

             Lymphocytes (%) (Auto) (test code = 736-9) 13.4         18.0-39.1  

  L             





[facility] HospitalMonocytes (%) (Auto)2018 06:53:00* 



             Test Item    Value        Reference Range Interpretation Comments

 

             Monocytes (%) (Auto) (test code = 5905-5) 7.8          4.4-11.3    

               





[facility] HospitalEosinophils (%) (Auto)2018 06:53:00
  * 



             Test Item    Value        Reference Range Interpretation Comments

 

             Eosinophils (%) (Auto) (test code = 713-8) 2.6          0.0-6.0    

                





[facility] HospitalBasophils (%) (Auto)2018 06:53:00* 



             Test Item    Value        Reference Range Interpretation Comments

 

             Basophils (%) (Auto) (test code = 706-2) 0.7          0.0-1.0      

              





[facility] HospitalIM GRANULOCYTES %2018 06:53:00* 



             Test Item    Value        Reference Range Interpretation Comments

 

             IM GRANULOCYTES % (test code = IM GRANULOCYTES %) 1.5          0.0-

1.0      H             





[facility] HospitalNeutrophils # (Auto)2018 06:53:00* 



             Test Item    Value        Reference Range Interpretation Comments

 

             Neutrophils # (Auto) (test code = 751-8) 6.0          2.1-6.9      

              





[facility] HospitalLymphocytes # (Auto)2018 06:53:00* 



             Test Item    Value        Reference Range Interpretation Comments

 

             Lymphocytes # (Auto) (test code = 05154-3) 1.1          1.0-3.2    

                





[facility] HospitalMonocytes # (Auto)2018 06:53:00* 



             Test Item    Value        Reference Range Interpretation Comments

 

             Monocytes # (Auto) (test code = 742-7) 0.6          0.2-0.8        

            





[facility] HospitalEosinophils # (Auto)2018 06:53:00* 



             Test Item    Value        Reference Range Interpretation Comments

 

             Eosinophils # (Auto) (test code = 711-2) 0.2          0.0-0.4      

              





[facility] HospitalBasophils # (Auto)2018 06:53:00* 



             Test Item    Value        Reference Range Interpretation Comments

 

             Basophils # (Auto) (test code = 704-7) 0.1          0.0-0.1        

            





[facility] HospitalAbsolute Immature Granulocyte (auto
2018 06:53:00* 



             Test Item    Value        Reference Range Interpretation Comments

 

                                        Absolute Immature Granulocyte (auto (inga

t code = Absolute Immature Granulocyte 

(auto)          0.12            0-0.1           H                





[facility] HospitalPlatelet Xldqbgdf0743-16-81 14:06:00* 



             Test Item    Value        Reference Range Interpretation Comments

 

             Platelet Estimate (test code = 59847-1) ADEQUATE                   

             





Former[facility] HospitalPlatelet Morphology Nauzjiu8064-71-70 
14:06:00* 



             Test Item    Value        Reference Range Interpretation Comments

 

             Platelet Morphology Comment (test code = 90873-1) NORMAL           

                       





[facility] HospitalPoikilocytosis2018-04-01 14:06:00* 



             Test Item    Value        Reference Range Interpretation Comments

 

             Poikilocytosis (test code = 779-9) MARKED                          

        





[facility] HospitalAnisocytosis2018-04-01 14:06:00* 



             Test Item    Value        Reference Range Interpretation Comments

 

             Anisocytosis (test code = 702-1) MARKED                            

      





[facility] HospitalMicrocytosis2018-04-01 14:06:00* 



             Test Item    Value        Reference Range Interpretation Comments

 

             Microcytosis (test code = 741-9) SLIG                              

      





[facility] HospitalMacrocytosis2018-04-01 14:06:00* 



             Test Item    Value        Reference Range Interpretation Comments

 

             Macrocytosis (test code = 738-5) MODERATE                          

      





[facility] HospitalRed Cell Morphology Pwtoxfg5713-79-29 
14:06:00* 



             Test Item    Value        Reference Range Interpretation Comments

 

             Red Cell Morphology Comment (test code = 6742-1) ABNORMAL          

                      





[facility] HospitalClostridium Difficile Toxin A & B
2018 15:00:00* 



             Test Item    Value        Reference Range Interpretation Comments

 

             Clostridium Difficile Toxin A & B (test code = 911880982) NEGATIVE 

    NEGATIVE                   





Testing on stool aspirate specimens is outside  claims since specime
n type not validated on this assay.[facility]
Differential Total Cells Kuqesif5041-27-81 10:33:00* 



             Test Item    Value        Reference Range Interpretation Comments

 

                          Differential Total Cells Counted (test code = Differen

tial Total Cells Counted) 

100                                                          





[facility] HospitalNeutrophils % (Manual)2018 10:33:00
  * 



             Test Item    Value        Reference Range Interpretation Comments

 

             Neutrophils % (Manual) (test code = 64014-4) 77           40-74    

    H             





[facility] HospitalBand Neutrophils %2018 10:33:00* 



             Test Item    Value        Reference Range Interpretation Comments

 

             Band Neutrophils % (test code = 764-1) 1                           

            





[facility] HospitalLymphocytes % (Manual)2018 10:33:00
  * 



             Test Item    Value        Reference Range Interpretation Comments

 

             Lymphocytes % (Manual) (test code = 737-7) 17           19-48      

  L             





[facility] HospitalMonocytes % (Manual)2018 10:33:00* 



             Test Item    Value        Reference Range Interpretation Comments

 

             Monocytes % (Manual) (test code = 744-3) 2            3.4-9.0      

L             





[facility] HospitalEosinophils % (Manual)2018 10:33:00
  * 



             Test Item    Value        Reference Range Interpretation Comments

 

             Eosinophils % (Manual) (test code = 714-6) 3            0-7        

                





[facility] HospitalHypochromasia2018-03-31 10:33:00* 



             Test Item    Value        Reference Range Interpretation Comments

 

             Hypochromasia (test code = 728-6) SLIGHT                           

       





[facility] HospitalRed Cell Distribution Iwwdx3377-76-16 
06:23:00* 



             Test Item    Value        Reference Range Interpretation Comments

 

             Red Cell Distribution Width (test code = 41022-0) 29.1         11.7

-14.4    H             





[facility] HospitalBedside Wiaijdq1244-88-53 08:30:00* 



             Test Item    Value        Reference Range Interpretation Comments

 

             Bedside Glucose (test code = 68109-0) 70                     

           





Meter ID: [id number] [facility] CenterCarcinoembryonic 
Vatwilr1977-53-02 06:07:00* 



             Test Item    Value        Reference Range Interpretation Comments

 

             Carcinoembryonic Antigen (test code = 2039-6) 7.5          0.0-4.7 

     H             





       Roche ECLIA methodology       Nonsmokers  <3.9                           
          Smokers     <5.6Performed at:   - [facility]  [id number] Director: Demian George MD, Phone:  [phone] [facility] HospitalTotal Cqmdocyus7467-68-67 07:53:00* 



             Test Item    Value        Reference Range Interpretation Comments

 

             Total Bilirubin (test code = 1975-2) 1.1          0.2-1.2          

          





[facility] HospitalAspartate Amino Transf (AST/SGOT)
2018 07:53:00* 



             Test Item    Value        Reference Range Interpretation Comments

 

                                        Aspartate Amino Transf (AST/SGOT) (test 

code = Aspartate Amino Transf 

(AST/SGOT))     14              5-34                             





[facility] HospitalAlanine Aminotransferase (ALT/SGPT)
2018 07:53:00* 



             Test Item    Value        Reference Range Interpretation Comments

 

             Alanine Aminotransferase (ALT/SGPT) (test code = 1742-6) 8         

   0-55                       





[facility] Eetigsw6604-55-54 07:53:00* 



             Test Item    Value        Reference Range Interpretation Comments

 

             Total Protein (test code = 2885-2) 5.6          6.5-8.1      L     

        





[facility] HospitalAlbumin2018-03-26 07:53:00* 



             Test Item    Value        Reference Range Interpretation Comments

 

             Albumin (test code = 1751-7) 2.9          3.5-5.0      L           

  





[facility] HospitalGlobulin2018-03-26 07:53:00* 



             Test Item    Value        Reference Range Interpretation Comments

 

             Globulin (test code = 95543-7) 2.7          2.3-3.5                

    





[facility] HospitalAlbumin/Globulin Ywbhh1833-08-25 07:53:00
  * 



             Test Item    Value        Reference Range Interpretation Comments

 

             Albumin/Globulin Ratio (test code = 1759-0) 1.1          0.8-2.0   

                 





[facility] HospitalAlkaline Kbmddgmzqad6386-19-74 07:53:00* 



             Test Item    Value        Reference Range Interpretation Comments

 

             Alkaline Phosphatase (test code = 6768-6) 62                 

               





[facility] HospitalProthrombin Hmyq5181-04-23 07:25:00* 



             Test Item    Value        Reference Range Interpretation Comments

 

             Prothrombin Time (test code = 5902-2) 13.7         11.9-14.5       

           





Former[facility] HospitalProthromb Time International Ratio
2018 07:25:00* 



             Test Item    Value        Reference Range Interpretation Comments

 

             Prothromb Time International Ratio (test code = 6301-6) 1.14       

                             





Oral Anticoagulant Therapy INR Values:1. Low Intensity Therapy        1.5 - 2.02
. Moderate Intensity Therapy   2.0 - 3.03. High Intensity Therapy(1)    2.5 - 3.
54. High Intensity Therapy(2)    3.0 - 4.05. Panic Value INR              > 5.0
[facility]ly [facility] HospitalActivated Partial Thromboplast Time
2018 07:25:00* 



             Test Item    Value        Reference Range Interpretation Comments

 

             Activated Partial Thromboplast Time (test code = 76423-5) 28.3     

    23.8-35.5                  





[facility] 



             Test Item    Value        Reference Range Interpretation Comments

 

             Stomatocytes (test code = 47344-2) SLIGHT                          

        





[facility] HospitalElliptocytes2018-03-24 10:22:00* 



             Test Item    Value        Reference Range Interpretation Comments

 

             Elliptocytes (test code = 56663-6) SLIGHT                          

        





[facility] Hospitaltool Occult Cbway0868-79-67 18:51:00* 



             Test Item    Value        Reference Range Interpretation Comments

 

             Stool Occult Blood (test code = 2335-8) POSITIVE     NEGATIVE     H

             





[facility] HospitalVitamin B12 Xrzkf7733-50-22 13:02:00* 



             Test Item    Value        Reference Range Interpretation Comments

 

             Vitamin B12 Level (test code = 23532-9) 218          213-816       

             





[facility] HospitalFolate2018-03-23 13:02:00* 



             Test Item    Value        Reference Range Interpretation Comments

 

             Folate (test code = 2284-8) 16.7         7.0-15.4     H            

 





[facility] HospitalThyroid Stimulating Hormone (TSH)
2018 12:49:00* 



             Test Item    Value        Reference Range Interpretation Comments

 

             Thyroid Stimulating Hormone (TSH) (test code = 04042-3) 1.130      

  0.350-4.940                





Former[facility] HospitalIron Vilkf1938-60-06 12:27:00* 



             Test Item    Value        Reference Range Interpretation Comments

 

             Iron Level (test code = 2498-4) 20                  L        

     





[facility] HospitalTotal Iron Binding Tbnyjsos2258-75-45 
12:27:00* 



             Test Item    Value        Reference Range Interpretation Comments

 

             Total Iron Binding Capacity (test code = 2500-7) 508          261-4

78      H             





[facility] HospitalPercent Iron Jntkhxselx6136-70-78 
12:27:00* 



             Test Item    Value        Reference Range Interpretation Comments

 

             Percent Iron Saturation (test code = 2502-3) 4            15-50    

    L             





[facility] HospitalTransferrin2018-03-23 12:27:00* 



             Test Item    Value        Reference Range Interpretation Comments

 

             Transferrin (test code = 3034-6) 363          180-382              

      





[facility] HospitalUrine JXR2883-74-46 21:10:00* 



             Test Item    Value        Reference Range Interpretation Comments

 

             Urine WBC (test code = 5821-4) 6-10         0-5          H         

    





[facility] HospitalUrine IMW1535-06-38 21:10:00* 



             Test Item    Value        Reference Range Interpretation Comments

 

             Urine RBC (test code = 30754-9) NONE         0-5                   

     





[facility] HospitalUrine Iewtakgb0855-36-13 21:10:00* 



             Test Item    Value        Reference Range Interpretation Comments

 

             Urine Bacteria (test code = 56611-0) MODERATE     NONE         H   

          





Formerly [facility] HospitalUrine Epithelial Qbkeg4638-93-83 21:10:00
  * 



             Test Item    Value        Reference Range Interpretation Comments

 

             Urine Epithelial Cells (test code = 75789-7) MANY         NONE     

                  





[facility] HospitalUrine Wgnag2873-65-09 20:56:00* 



             Test Item    Value        Reference Range Interpretation Comments

 

             Urine Color (test code = 5778-6) YELLOW       YELLOW               

      





[facility] HospitalUrine Smxiqfq4691-29-16 20:56:00* 



             Test Item    Value        Reference Range Interpretation Comments

 

             Urine Clarity (test code = 55836-9) SL CLOUDY    CLEAR             

         





[facility] HospitalUrine Specific Lzqyaxl4442-70-98 20:56:00
  * 



             Test Item    Value        Reference Range Interpretation Comments

 

             Urine Specific Gravity (test code = 5811-5) 1.015        1.010-1.02

5                





[facility] HospitalUrine eN9748-60-64 20:56:00* 



             Test Item    Value        Reference Range Interpretation Comments

 

             Urine pH (test code = 38218-7) 7            5-7                    

    





[facility] HospitalUrine Leukocyte Hpcrtolt1068-63-35 
20:56:00* 



             Test Item    Value        Reference Range Interpretation Comments

 

             Urine Leukocyte Esterase (test code = 5799-2) 2+           NEGATIVE

     H             





[facility] HospitalUrine Ldyqeln0277-03-59 20:56:00* 



             Test Item    Value        Reference Range Interpretation Comments

 

             Urine Nitrite (test code = 80102-8) NEGATIVE     NEGATIVE          

         





[facility] HospitalUrine Ncutdnq2814-20-37 20:56:00* 



             Test Item    Value        Reference Range Interpretation Comments

 

             Urine Protein (test code = 5804-0) NEGATIVE     NEGATIVE           

        





[facility] HospitalUrine Glucose (UA)2018 20:56:00* 



             Test Item    Value        Reference Range Interpretation Comments

 

             Urine Glucose (UA) (test code = 2349-9) NEGATIVE     NEGATIVE      

             





[facility] HospitalUrine Hfckvul9761-08-27 20:56:00* 



             Test Item    Value        Reference Range Interpretation Comments

 

             Urine Ketones (test code = 43903-4) NEGATIVE     NEGATIVE          

         





[facility] HospitalUrine Ydmorqkhanwh9651-77-53 20:56:00* 



             Test Item    Value        Reference Range Interpretation Comments

 

             Urine Urobilinogen (test code = 91578-6) 1            0.2-1        

              





[facility] HospitalUrine Nvqfcfobs4350-95-25 20:56:00* 



             Test Item    Value        Reference Range Interpretation Comments

 

             Urine Bilirubin (test code = 1978-6) NEGATIVE     NEGATIVE         

          





[facility] HospitalUrine Fpfcq3979-48-73 20:56:00* 



             Test Item    Value        Reference Range Interpretation Comments

 

             Urine Blood (test code = 73912-1) NEGATIVE     NEGATIVE            

       





[facility] HospitalMagnesium Xpadm2770-25-95 20:31:00* 



             Test Item    Value        Reference Range Interpretation Comments

 

             Magnesium Level (test code = 50586-7) 2.0          1.3-2.1         

           





[facility] HospitalCreatine Kinase VP1291-41-11 20:30:00* 



             Test Item    Value        Reference Range Interpretation Comments

 

             Creatine Kinase MB (test code = 32093-3) 0.70         0-5.0        

              





[facility] HospitalTroponin -67-64 20:30:00* 



             Test Item    Value        Reference Range Interpretation Comments

 

             Troponin I (test code = EDM2117) 0.002        0-0.300              

      





[facility] HospitalCreatine Jpqfwd2559-53-16 20:23:00* 



             Test Item    Value        Reference Range Interpretation Comments

 

             Creatine Kinase (test code = 2157-6) 60                      

          





[facility]ly [facility] HospitalCT ABDOMEN/PELVIS WOW    [facility]   [address] [address]      Patient Name: JORGE BASS   MR #: [id number]    : 1935 
Age/Sex: 82/F  Acct #: [id number] Req #: [id number]  Adm Physician: BERNY KENNEY MD    Ordered by: BERNY KENNEY MD  Report #: [id number]   Location: MED/SURG2  
Room/Bed: [facility]    _____________________________________________________
______________________________________________    Procedure: [id number] CT/CT ABD
OMEN/PELVIS WOW  Exam Date: 18                            Exam Time: 1300 
      REPORT STATUS: Signed    PROCEDURE: CT ABDOMEN  T  PELVIS W/WO CONTRAST   
    TECHNIQUE:    The abdomen and pelvis were scanned utilizing a multidetector 
helical    scanner from the diaphragm to the lesser trochanter before and after 
   the IV administration of 100 cc of Isovue 370.  Coronal and sagittal    multi
planar reformations were obtained.       COMPARISON: None.       INDICATIONS:   
ANEMIA, ADRENAL AND LIVER LESION       FINDINGS:   LOWER THORAX: Small pleural e
ffusions and adjacent lower lobe    atelectasis.       HEPATOBILIARY: Left hepat
ic 1.1 cm cyst with tiny punctate    calcification. No suspicious focal hepatic 
lesions.  No biliary ductal    dilatation.   SPLEEN: No splenomegaly.   PANCREAS
: No focal masses or ductal dilatation.       ADRENALS: Right adrenal 1.3 cm and
 left adrenal 0.9 cm nodules have    densities on noncontrast CT which likely re
flect lipid rich adenomas.    The right nodule has a washout of 61% compatible w
ith an adenoma, and    the left nodule has a washout of 24% which is indetermina
te.     Evaluation is somewhat limited as there is hyperdensity in the right    
renal pelvis and renal cortices which may be related to test bolus    injection.
    KIDNEYS/URETERS: No hydronephrosis, stones, or solid mass lesions. A    righ
t superior pole 0.9 cm hypodensity is indeterminate.    PELVIC ORGANS/BLADDER: F
oley catheter in place. Otherwise,    unremarkable.       PERITONEUM / RETROPERI
TONEUM: Small amount of fluid in the left abdomen    may represent complex fluid
 such as hemorrhage from recent surgery.    Small amount of likely post-operativ
e free air. No evidence of active    contrast extravasation on arterial phase wi
thin the visualized portions    of this hyperdense fluid. This fluid increases i
n density between the    arterial phase (series 5 image 80) measuring 22 HU to 5
4 HU (series 8    image 90).    LYMPH NODES: No lymphadenopathy.   VESSELS: Repl
aced left hepatic artery arises from the left gastric    artery. Scattered ather
osclerotic calcifications.       GI TRACT: No distention or wall thickening. Pos
tsurgical changes    related to right hemicolectomy with ileal colonic anastomos
is. Multiple    dilated loops of small bowel without transition which gradually 
tapers    to distal ileum likely related to ileus. Enteric contrast is noted in 
   the colon.       BONES AND SOFT TISSUES: Midline laparotomy scar and multiple
 skin    staples. Mild anasarca.        IMPRESSION:   1. Status post right colon
ic resection and ileocolonic anastomosis.   2. Hyperdense fluid in the left abdo
men may represent hemorrhage from    recent surgery. No evidence of active arter
ial contrast extravasation    within the visualized portions of this hyperdense 
fluid. However,    increased density of this fluid between the arterial phase an
d the 15    minute delayed phase may reflect a slow active bleed/oozing or    cl
otting.  Consider serial hemoglobin and hematocrit and follow up CT    in the se
tting of worsening anemia.   3. Multiple dilated loops of small bowel which grad
ually tapers to the    distal ileum likely related to ileus.    4. Bilateral adr
enal nodules with density on the noncontrast phase    compatible with lipid rich
 adenomas. Adrenal washout of the right    adrenal nodule is concordant with an 
adenoma. Left adrenal washout    indicates the nodule is indeterminate.  Given s
uggestion of test    contrast bolus prior to acquisition of the non-contrast pha
se (contrast    present in the urinary collecting system), evaluation is limited
.    Recommend follow up CT or MRI adrenal mass protocol on an outpatient    bas
is.    5. Left hepatic cyst. No suspicious hepatic lesions.    6. Small pleural 
effusions, anasarca, and ascites. Small amount of    likely post-operative pneum
operitoneum.    1.        Findings discussed with Dr. Kenney on 3/30/2018 at 1640 
hrs.           Dictated by:  Clint Youssef M.D. on 3/30/2018 at 14:47        El
ectronically approved by:  Clint Youssef M.D. on 3/30/2018 at 14:47            
       Dictated By: CLINT YOUSSEF MD  Electronically Signed By: CLINT YOUSSEF MD
 on 18 1447  Transcribed By: DANDRE on 18 1447       COPY TO:   BERNY KENNEY MD        ABDOMEN-1VIEW (KUB)    [place] [address]      Patient Name: 
JORGE BASS   MR #: [id number]    : 1935 Age/Sex: 82/F  Acct #: 
[id number] Req #: [id number]  Adm Physician: BERNY KENNEY MD    Ordered by: CELSO WOO MD  Report #: [id number]   Location: MED/SURG2  Room/Bed: [facility]    
_____________________________________________________
______________________________________________    Procedure: [id number] DX/ABDOME
N-1VIEW (KUB)  Exam Date: 18                            Exam Time:    
    REPORT STATUS: Signed    EXAM: Abdomen 1  View   INDICATION:                
 COMPARISON: CT dated 3/25/2018      FINDINGS:   Nonobstructive bowel gas patter
n. Mild pneumoperitoneum, probably postsurgical,   evident by Rigler's sign in l
eft abdomen.   Surgical clips overlying mid abdomen. There is also suture line i
n right   abdomen.   Contrast excretion from prior CT in the bladder. Questionab
le retained contrast   within bowel in left abdomen.   Upper abdomen is excluded
 from the image.   No acute osseous abnormality.      IMPRESSION:   1.  Very sullivan
ited single view exam.   2.  Nonobstructive bowel gas pattern.   3.  Pneumoperit
oneum, which is probably postsurgical.         Signed by: Dr. Milton Vaughan MD 
on 3/29/2018 8:51 PM        Dictated By: MILTON VAUGHAN MD  Electronically Signed
 By: MILTON VAUGHAN MD on 18  Transcribed By: DOMINIC on 18
       COPY TO:   CELSO WOO MD        CT CHEST W    [place] [address]      Patient 
Name: JORGE BASS   MR #: [id number]    : 1935 Age/Sex: 82/F  Acct 
#: [id number] Req #: [id number]  Adm Physician: BERNY KENNEY MD    Ordered by: 
BERNY KENNEY MD  Report #: [id number]   Location: MED/SURG2  Room/Bed: [facility]    
_____________________________________________________
______________________________________________    Procedure: [id number] CT/CT LETICIA
ST W  Exam Date: 18                            Exam Time: 2156
T STATUS: Signed    EXAM: CT CHEST W   DATE: 3/28/2018 8:57 PM     INDICATION:  
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
  None   TECHNIQUE: Multidetector CT scanning of the chest was performed.  Coron
al and   sagittal multiplanar reformations were obtained.       IV Contrast: 100
 ml Isovue 370/300      FINDINGS:   LUNGS AND PLEURA: There are small bilateral 
pleural effusions with associated   lower lobe and scattered lingular and right 
middle lobe atelectasis. There is   an irregular 3.1 cm opacity in the posterior
 right upper lobe with adjacent   small nodules. Nonspecific 4 mm right upper an
d middle lobe nodules (series 3   image 16, 60)      HEART, MEDIASTINUM, VESSELS
: Bilateral thyroid nodules, largest 2.4 cm on the   left. Mild cardiomegaly wit
h coronary artery and aortic atherosclerotic   disease. No suspicious adenopathy
.      UPPER ABDOMEN: Pneumoperitoneum and mild to moderate visualized free flui
d,   incompletely assessed, status post recent surgery.  Cholecystectomy, right 
  renal cyst, and bilateral adrenal nodules again noted. Stable 9 mm left liver 
  cyst. A 9 mm segment 3 low-density liver lesion (image 102) is more conspicuou
s   than on prior, which was degraded by motion in this region.  Probable right 
  flash fill hemangioma (image 99).      MUSCULOSKELETAL: No suspicious findings
.      IMPRESSION:      1. Small bilateral effusions with scattered atelectasis.
   2. Irregular 3.1 cm right upper lobe opacity and adjacent tiny nodules, most 
  likely infectious. Attention on follow-up.   3. Two nonspecific 4 mm right upp
er and middle lobe nodules.   4. Left liver subcentimeter low density lesion mos
t likely a small metastasis.    This is distinct from the left liver cyst.      
Signed by: Dr Rosa Moses MD on 3/29/2018 12:46 AM        Dictated By: ROSA MOSES MD  Electronically Signed By: ROSA MOSES MD on 18  Tra
nscribed By: DOMINIC on 18       COPY TO:   BERNY KENNEY MD        CT 
ABDOMEN/PELVIS W    [place] [address]      Patient Name: JORGE BASS   MR #: 
[id number]    : 1935 Age/Sex: 82/F  Acct #: [id number] Req #: [id number]
[id number]  Adm Physician: BERNY KENNEY MD    Ordered by: CELSO WOO MD  Report #: 
[id number]   Location: MED/SURG  Room/Bed: 100    
______________________________________________________
_____________________________________________    Procedure: [id number] CT/CT ABDO
MEN/PELVIS W  Exam Date: 18                            Exam Time: 1332    
   REPORT STATUS: Signed       ******** ADDENDUM #1 ********      Addendum: Ques
tionable thickening of the sigmoid junction. Proctitis not   excluded. Direct vi
sualization could be obtained as indicated.      Signed by: Dr. Kareem Saab MD
 on 3/25/2018 2:23 PM   ******** ORIGINAL REPORT ********      EXAM: CT Abdomen 
and Pelvis WITH contrast        INDICATION: EGD same day. Abnormal findings. Rul
e out metastasis.        COMPARISON: None.      TECHNIQUE:  Abdomen and Pelvis w
as scanned utilizing a multidetector helical   scanner after administration of I
V contrast. Coronal and sagittal reformations   were obtained.               IV 
CONTRAST: 100 mL Isovue-370                        COMPLICATIONS: None      RADI
ATION DOSE:        Total DLP:155 mGy*cm        Estimated effective dose: (DLP x 
0.015 x size factor) mSv        CTDIvol has been reviewed. It is below the limit
s set by the Radiation   Protocol Committee (RPC).      FINDINGS:        Abdomen
:          Lung Bases: Atelectasis present lung bases.      Solid Organs: 9 mm c
yst anterior left hepatic lobe. Focal fat attenuation along   the falciform liga
ment. Cholecystectomy clips are present. There is a 15 mm   nodule in the left a
drenal gland with indeterminate Hounsfield units of 71. 20   mm right adrenal le
roshni present with indeterminate Hounsfield units of 81.   Hypodensity right kidn
ey statistically cyst, but too small to definitively   characterize. Pancreas un
remarkable.      Upper GI Tract: Gastric decompression limits evaluation. No sma
ll bowel   obstructive changes.      Vascularity: Moderate aortoiliac vascular c
alcifications with no aneurysm.      Lymph Nodes: No suspicious mesenteric or ao
rtocaval adenopathy.      Other: None.         Pelvis:          Bladder: Unremar
kable.      Other: Uterus absent.      Colon: Areas of decompression limits eval
uation. Questionable wall thickening   in the rectal junction.      Bones: No ac
[place] findings.         IMPRESSION:    1.  Bilateral indeterminate adrenal lesions
. Adrenal mass protocol CT or MRI   recommended.      2.  If concern is present 
for metastatic disease in the chest, dedicated CT   chest to be recommended.    
  3.  Colonic evaluation limited by decompression.      Signed by: Dr. Kareem johansen MD on 3/25/2018 1:58 PM        Dictated By: KAREEM SAAB MD  Electronical
ly Signed By: KAREEM SAAB MD on 18 1423  Transcribed By: DOMINIC on  1358       COPY TO:   CELSO WOO MD        CHEST 2 VIEWS    [place] [address]      Patient Name: JORGE BASS   MR #: [id number]    : 1935 
Age/Sex: 82/F  Acct #: [id number] Req #: [id number]  Adm Physician:     
Ordered by: DAISY CORTEZ MD  Report #: [id number]   Location: ER  Room/Bed:    
 ___________________________________________________________________________
________________________    Procedure: [id number] DX/CHEST 2 VIEWS  Exam Date:   
                          Exam Time:        REPORT STATUS: Signed    EXAM: CHEST
 2 VIEWS, PA and lateral   INDICATION: Anemia, constipation   COMPARISON: None  
    FINDINGS:   LINES/TUBES: None      LUNGS: No consolidations or edema. Emphys
ematous changes.      PLEURA: No effusions or pneumothorax.      HEART AND MEDIA
STINUM: Normal size and contour.      BONES AND SOFT TISSUES: No acute findings.
 Surgical clips right upper quadrant   of the abdomen.      IMPRESSION:   No acu
te thoracic abnormality.            Signed by: Dr. Bismark Bass M.D. on 3/
 9:57 PM        Dictated By: BISMAKR BASS MD  Electronically Signed By: 
BISMARK BASS MD on 18  Transcribed By: DOMINIC on 18     
  COPY TO:   DAISY CORTEZ MD        ABDOMEN COMP INCL UPR or DECUB    [place] [id number]      Patient Name: JORGE BASS   MR #: [id number]    : 1935 
Age/Sex: 82/F  Acct #: [id number] Req #: [id number]  Adm Physician:     
Ordered by: DAISY CORTEZ MD  Report #: [id number]   Location: ER  Room/Bed:    
 ___________________________________________________________________________
________________________    Procedure: [id number] DX/ABDOMEN COMP INCL UPR or DEC
UB  Exam Date:                             Exam Time:        REPORT STATUS: Sign
ed    EXAM:  ABDOMEN COMP INCL UPR or DECUB, supine and erect   INDICATION: Anem
ia, constipation   COMPARISON: None      FINDINGS:   LINES/TUBES: None      FABIAN
L PATTERN: No evidence for obstruction.      SOFT TISSUES:     Surgical clips ri
ght upper quadrant of the abdomen. Surgical   clips project over the pelvis. Phl
eboliths in the pelvis.      LUNG BASES: Not included      BONES: No acute findi
ngs.      IMPRESSION:   Moderate to large amount of retained stool. No evidence 
of obstruction.                  Signed by: Dr. Bismark Bass M.D. on 3/22/
2018 9:58 PM        Dictated By: BISMARK BASS MD  Electronically Signed By: BISMARK BASS MD on 18  Transcribed By: DOMINIC on 18       C
OPY TO:   DAISY CORTEZ MD

## 2020-09-18 NOTE — OPERATIVE REPORT
DATE OF PROCEDURE:  09/18/2020

 

SURGEON:  Stan Martin MD

 

PROCEDURE:  Lexiscan nuclear stress test.

 

INDICATION:  Chest pain.

 

TECHNIQUE:  The patient was given 11 mCi of Myoview.  Resting images were obtained in

the horizontal long axis, vertical long axis, and short axis.  The patient was then

hooked up to the EKG machine.  Lexiscan was infused over 15 seconds.  Immediately after

Lexiscan infusion, the patient was given 35 millicuries of Myoview.  Stress images were

obtained 30 minutes after completion of Lexiscan infusion.  Stress images were obtained

in the horizontal long axis, and vertical long axis, and short axis. 

 

RESULTS:  

1. The resting EKG demonstrated normal sinus rhythm with nonspecific ST and T-wave

changes. 

2. There was some mild upsloping ST changes during Lexiscan infusion, but no symptoms.

3. There was normal perfusion to all segments of the myocardium in both stress and rest.

4. There was normal left ventricular size and function with an ejection fraction of 70%.

 

CONCLUSION:  Normal Lexiscan nuclear stress test with no evidence of ischemia.

 

 

 

 

______________________________

Stan Martin MD

 

[facility]

D:  09/18/2020 13:54:06

T:  09/18/2020 14:45:12

Job #:  [id number]

 

cc:            Westley Blackwood MD

## 2021-11-17 LAB
ANION GAP SERPL CALC-SCNC: 18.6 MMOL/L (ref 8–16)
BASOPHILS # BLD AUTO: 0 10*3/UL (ref 0–0.1)
BASOPHILS NFR BLD AUTO: 0.7 % (ref 0–1)
BUN SERPL-MCNC: 16 MG/DL (ref 7–26)
BUN/CREAT SERPL: 21 (ref 6–25)
CALCIUM SERPL-MCNC: 10.1 MG/DL (ref 8.4–10.2)
CHLORIDE SERPL-SCNC: 103 MMOL/L (ref 98–107)
CO2 SERPL-SCNC: 23 MMOL/L (ref 22–29)
DEPRECATED NEUTROPHILS # BLD AUTO: 3.9 10*3/UL (ref 2.1–6.9)
EGFRCR SERPLBLD CKD-EPI 2021: 70 ML/MIN (ref 60–?)
EOSINOPHIL # BLD AUTO: 0.1 10*3/UL (ref 0–0.4)
EOSINOPHIL NFR BLD AUTO: 1.3 % (ref 0–6)
ERYTHROCYTE [DISTWIDTH] IN CORD BLOOD: 13.2 % (ref 11.7–14.4)
GLUCOSE SERPLBLD-MCNC: 104 MG/DL (ref 74–118)
HCT VFR BLD AUTO: 39.3 % (ref 34.2–44.1)
HGB BLD-MCNC: 12.8 G/DL (ref 12–16)
LYMPHOCYTES # BLD: 1.6 10*3/UL (ref 1–3.2)
LYMPHOCYTES NFR BLD AUTO: 25.6 % (ref 18–39.1)
MCH RBC QN AUTO: 29.8 PG (ref 28–32)
MCHC RBC AUTO-ENTMCNC: 32.6 G/DL (ref 31–35)
MCV RBC AUTO: 91.4 FL (ref 81–99)
MONOCYTES # BLD AUTO: 0.5 10*3/UL (ref 0.2–0.8)
MONOCYTES NFR BLD AUTO: 8.6 % (ref 4.4–11.3)
NEUTS SEG NFR BLD AUTO: 63.3 % (ref 38.7–80)
PLATELET # BLD AUTO: 299 X10E3/UL (ref 140–360)
POTASSIUM SERPL-SCNC: 3.6 MMOL/L (ref 3.5–5.1)
RBC # BLD AUTO: 4.3 X10E6/UL (ref 3.6–5.1)
SODIUM SERPL-SCNC: 141 MMOL/L (ref 136–145)

## 2021-11-18 ENCOUNTER — HOSPITAL ENCOUNTER (OUTPATIENT)
Dept: HOSPITAL 88 - OR | Age: 86
Discharge: HOME | End: 2021-11-18
Attending: OPHTHALMOLOGY
Payer: MEDICARE

## 2021-11-18 VITALS — DIASTOLIC BLOOD PRESSURE: 65 MMHG | SYSTOLIC BLOOD PRESSURE: 136 MMHG

## 2021-11-18 DIAGNOSIS — Z01.810: ICD-10-CM

## 2021-11-18 DIAGNOSIS — Z86.2: ICD-10-CM

## 2021-11-18 DIAGNOSIS — H54.62: ICD-10-CM

## 2021-11-18 DIAGNOSIS — R06.09: ICD-10-CM

## 2021-11-18 DIAGNOSIS — Z01.812: ICD-10-CM

## 2021-11-18 DIAGNOSIS — I10: ICD-10-CM

## 2021-11-18 DIAGNOSIS — Z95.5: ICD-10-CM

## 2021-11-18 DIAGNOSIS — K21.9: ICD-10-CM

## 2021-11-18 DIAGNOSIS — I25.10: ICD-10-CM

## 2021-11-18 DIAGNOSIS — H25.11: Primary | ICD-10-CM

## 2021-11-18 DIAGNOSIS — E78.5: ICD-10-CM

## 2021-11-18 DIAGNOSIS — Z20.822: ICD-10-CM

## 2021-11-18 DIAGNOSIS — Z88.6: ICD-10-CM

## 2021-11-18 DIAGNOSIS — Z85.038: ICD-10-CM

## 2021-11-18 DIAGNOSIS — Z79.02: ICD-10-CM

## 2021-11-18 DIAGNOSIS — Z79.899: ICD-10-CM

## 2021-11-18 PROCEDURE — 66984 XCAPSL CTRC RMVL W/O ECP: CPT

## 2021-11-18 PROCEDURE — 36415 COLL VENOUS BLD VENIPUNCTURE: CPT

## 2021-11-18 PROCEDURE — 80048 BASIC METABOLIC PNL TOTAL CA: CPT

## 2021-11-18 PROCEDURE — 93005 ELECTROCARDIOGRAM TRACING: CPT

## 2021-11-18 PROCEDURE — 85025 COMPLETE CBC W/AUTO DIFF WBC: CPT

## 2022-02-02 LAB
BASOPHILS # BLD AUTO: 0.1 10*3/UL (ref 0–0.1)
BASOPHILS NFR BLD AUTO: 0.9 % (ref 0–1)
DEPRECATED NEUTROPHILS # BLD AUTO: 3.2 10*3/UL (ref 2.1–6.9)
EOSINOPHIL # BLD AUTO: 0.1 10*3/UL (ref 0–0.4)
EOSINOPHIL NFR BLD AUTO: 1.8 % (ref 0–6)
ERYTHROCYTE [DISTWIDTH] IN CORD BLOOD: 13.1 % (ref 11.7–14.4)
HCT VFR BLD AUTO: 38.1 % (ref 34.2–44.1)
HGB BLD-MCNC: 12.2 G/DL (ref 12–16)
LYMPHOCYTES # BLD: 1.8 10*3/UL (ref 1–3.2)
LYMPHOCYTES NFR BLD AUTO: 32.4 % (ref 18–39.1)
MCH RBC QN AUTO: 29.8 PG (ref 28–32)
MCHC RBC AUTO-ENTMCNC: 32 G/DL (ref 31–35)
MCV RBC AUTO: 93.2 FL (ref 81–99)
MONOCYTES # BLD AUTO: 0.5 10*3/UL (ref 0.2–0.8)
MONOCYTES NFR BLD AUTO: 8 % (ref 4.4–11.3)
NEUTS SEG NFR BLD AUTO: 56.4 % (ref 38.7–80)
PLATELET # BLD AUTO: 269 X10E3/UL (ref 140–360)
RBC # BLD AUTO: 4.09 X10E6/UL (ref 3.6–5.1)

## 2022-02-03 ENCOUNTER — HOSPITAL ENCOUNTER (OUTPATIENT)
Dept: HOSPITAL 88 - OR | Age: 87
Discharge: HOME | End: 2022-02-03
Attending: INTERNAL MEDICINE
Payer: MEDICARE

## 2022-02-03 VITALS — DIASTOLIC BLOOD PRESSURE: 54 MMHG | SYSTOLIC BLOOD PRESSURE: 108 MMHG

## 2022-02-03 DIAGNOSIS — H54.62: ICD-10-CM

## 2022-02-03 DIAGNOSIS — Z95.5: ICD-10-CM

## 2022-02-03 DIAGNOSIS — Z79.02: ICD-10-CM

## 2022-02-03 DIAGNOSIS — K21.9: ICD-10-CM

## 2022-02-03 DIAGNOSIS — Z85.038: ICD-10-CM

## 2022-02-03 DIAGNOSIS — Z20.822: ICD-10-CM

## 2022-02-03 DIAGNOSIS — Z91.041: ICD-10-CM

## 2022-02-03 DIAGNOSIS — K28.9: ICD-10-CM

## 2022-02-03 DIAGNOSIS — Z79.899: ICD-10-CM

## 2022-02-03 DIAGNOSIS — R63.6: ICD-10-CM

## 2022-02-03 DIAGNOSIS — K22.2: Primary | ICD-10-CM

## 2022-02-03 DIAGNOSIS — K44.9: ICD-10-CM

## 2022-02-03 DIAGNOSIS — I25.10: ICD-10-CM

## 2022-02-03 DIAGNOSIS — Z01.812: ICD-10-CM

## 2022-02-03 DIAGNOSIS — N39.0: ICD-10-CM

## 2022-02-03 DIAGNOSIS — Z88.6: ICD-10-CM

## 2022-02-03 DIAGNOSIS — K29.50: ICD-10-CM

## 2022-02-03 DIAGNOSIS — I10: ICD-10-CM

## 2022-02-03 PROCEDURE — 43233 EGD BALLOON DIL ESOPH30 MM/>: CPT

## 2022-02-03 PROCEDURE — 36415 COLL VENOUS BLD VENIPUNCTURE: CPT

## 2022-02-03 PROCEDURE — 43239 EGD BIOPSY SINGLE/MULTIPLE: CPT

## 2022-02-03 PROCEDURE — 85025 COMPLETE CBC W/AUTO DIFF WBC: CPT

## 2022-05-26 ENCOUNTER — HOSPITAL ENCOUNTER (OUTPATIENT)
Dept: HOSPITAL 88 - RAD | Age: 87
End: 2022-05-26
Attending: INTERNAL MEDICINE
Payer: MEDICARE

## 2022-05-26 DIAGNOSIS — J18.9: Primary | ICD-10-CM

## 2022-05-26 PROCEDURE — 71046 X-RAY EXAM CHEST 2 VIEWS: CPT
